# Patient Record
Sex: MALE | Race: WHITE | Employment: UNEMPLOYED | URBAN - METROPOLITAN AREA
[De-identification: names, ages, dates, MRNs, and addresses within clinical notes are randomized per-mention and may not be internally consistent; named-entity substitution may affect disease eponyms.]

---

## 2017-01-10 ENCOUNTER — OFFICE VISIT (OUTPATIENT)
Dept: SURGERY | Age: 32
End: 2017-01-10

## 2017-01-10 VITALS
HEIGHT: 65 IN | WEIGHT: 173 LBS | OXYGEN SATURATION: 98 % | HEART RATE: 114 BPM | SYSTOLIC BLOOD PRESSURE: 148 MMHG | TEMPERATURE: 98.3 F | BODY MASS INDEX: 28.82 KG/M2 | DIASTOLIC BLOOD PRESSURE: 98 MMHG | RESPIRATION RATE: 17 BRPM

## 2017-01-10 DIAGNOSIS — L02.215 PERINEAL ABSCESS: Primary | ICD-10-CM

## 2017-01-10 NOTE — MR AVS SNAPSHOT
Visit Information Date & Time Provider Department Dept. Phone Encounter #  
 1/10/2017  1:00 PM MD Gabriela Zayas [facility] [phone] [id number] Your Appointments 1/10/2017  1:00 PM  
Follow Up with MD Gabriela [facility] [place] [facility]) Appt Note: Open wound at surgery site/ self referred/ Medicare [address], [address], [address] [place] [id number]  
  
    
 2/7/2017 11:00 AM  
Follow Up with MD Gabriela Zayas [facility] [place] [facility]) Appt Note: 1 month f/up [address], [address]  
[phone] Upcoming Health Maintenance Date Due Pneumococcal 19-64 Medium Risk (1 of 1 - PPSV23) 3/12/2003 DTaP/Tdap/Td series (1 - Tdap) 3/12/2005 Allergies as of 1/10/2017  Review Complete On: 1/10/2017 By: Monique Cardenas MD  
  
 Severity Noted Reaction Type Reactions Ciprofloxacin  11/26/2014    Other (comments) Nerve damage in hands Flagyl [Metronidazole]  11/26/2014    Other (comments) Other reaction(s): neurological reaction Pt states he has \"nerve damage\" to his hands Nerve problem Current Immunizations  Never Reviewed Name Date Influenza Vaccine (Quad) PF 10/5/2016  8:45 AM  
  
 Not reviewed this visit You Were Diagnosed With   
  
 Codes Comments Perineal abscess    -  Primary ICD-10-CM: L02.215 ICD-9-CM: 630. 2 Peristomal skin breakdown (HCC)     ICD-10-CM: L62.234 ICD-9-CM: 707.9 Vitals BP Pulse Temp Resp Height(growth percentile) Weight(growth percentile) (!) 148/98 (BP 1 Location: Right arm, BP Patient Position: Sitting) (!) 114 98.3 °F (36.8 °C) (Oral) 17 5' 5\" (1.651 m) 173 lb (78.5 kg) SpO2 BMI Smoking Status 98% 28.79 kg/m2 Current Every Day Smoker BMI and BSA Data Body Mass Index Body Surface Area 28.79 kg/m 2 1.9 m 2 Preferred Pharmacy Pharmacy Name Phone [facility], [facility] [address] [phone] Your Updated Medication List  
  
   
This list is accurate as of: 1/10/17 12:43 PM.  Always use your most recent med list.  
  
  
  
  
 * PERCOCET 5-325 mg per tablet Generic drug:  oxyCODONE-acetaminophen Take 1 Tab by mouth every four (4) hours as needed for Pain. * oxyCODONE-acetaminophen 5-325 mg per tablet Commonly known as:  PERCOCET Take 1 Tab by mouth every six (6) hours as needed for Pain. Max Daily Amount: 4 Tabs. SEROquel 300 mg tablet Generic drug:  QUEtiapine Take 300 mg by mouth nightly. sildenafil citrate 100 mg tablet Commonly known as:  VIAGRA Take 1 Tab by mouth daily as needed. SUBOXONE 8-2 mg Film sublingaul film Generic drug:  buprenorphine-naloxone TAKE 1 AND 1/2 FILMS UNDER TONGUE DAILY FOR 6 DAYS  
  
 therapeutic multivitamin tablet Commonly known as:  [facility] Take 1 Tab by mouth daily. VITAMIN B-12 500 mcg tablet Generic drug:  cyanocobalamin Take 500 mcg by mouth daily. * Notice: This list has 2 medication(s) that are the same as other medications prescribed for you. Read the directions carefully, and ask your doctor or other care provider to review them with you. Please provide this summary of care documentation to your next provider. Your primary care clinician is listed as Harrie Fothergill. If you have any questions after today's visit, please call [phone].

## 2017-01-10 NOTE — LETTER
NOTIFICATION RETURN TO WORK / SCHOOL 
 
1/10/2017 12:23 PM 
 
Mr. Sarah Ontiveros [address] [facility] [address] To Whom It May Concern: 
 
Sarah Ontiveros is currently under the care of [address] [place] [address]. He underwent a surgical procedure on 11/16/2016 which required in home wound care for a month post op. He was seen in our office today, 1/10/17. If there are questions or concerns please have the patient contact our office. Sincerely, Hernan Walker MD

## 2017-01-10 NOTE — PROGRESS NOTES
[facility]  [id number] [place] [address], [address] [place] [address]              Patient: Lakshmi Cornell  Admitted: (Not on file) MRN: [id number]     Age:  27 y.o.,      Sex: male    YOB: 1985       Conor Avila is an 27 y.o. male who is here for a follow up post op visit. He is status post the following procedures for perineal/pelvic abscess with a chronic abscess cavity and peristomal skin breakdown with superficial sinus tracks on 11/16/2016:      1. Examination under anesthesia, drainage of pelvic abscess and debridement of chronic abscess cavity. 2. Repair of peristomal skin breakdown with laying open of the sinus tracts and debridement of the superficial scarred/necrotic skin.      The intraoperative findings showed a deep perineal/pelvic chronic abscess cavity at the midportion of the  perineal wound slightly to the right of the midline. There were chronic necrotic debris within the cavity which measured about 3 cm in size. This cavity was several centimeters deep to the perineal incision site. Also a superficial peristomal skin breakdown medial to the colostomy with several superficial sinus tracks present. When laid open, chronic granulated tissue was present. The open wound measured about 10 mm x 5 mm situated radially to the colostomy.      He now feels that the peristomal wound has completely healed. Also his operative site in the perineum has healed, but now another open drainage area just anterior to this site developed. The patient' wife has been packing this wound. Otherwise he is currently without complaints. His bowels are regular via his ileostomy, and the patient denies fever.         Objective    Vitals:    01/10/17 1202   BP: (!) 148/98   Pulse: (!) 114   Resp: 17   Temp: 98.3 °F (36.8 °C)   TempSrc: Oral   SpO2: 98%   Weight: 78.5 kg (173 lb)   Height: 5' 5\" (1.651 m)   PainSc:   5   PainLoc: Rectum       Physical Exam:  General: alert, cooperative, no distress, appears stated age  Abdomen: soft, non-distended. Colostomy intact with now healed peristomal skin wound. Perineum: With the patient in the left alteral position the previous open wound was healed. However another small (3-4 mm) open wound was present several cm anterior atr the midline. There was packing present and thsi was removed. There appeared to be a sinus tract about 4-5 cm deep. The tract was packed with 1/4 inch Iodoform Nu Gauge. The wound appeared to be clean without active infection present. Assessment / Plan    Mr. Golden Thompson is overall doing well postoperatively. The patient will continue the local wound care with daily packing to the perineal wound. Return in 4 weeks for follow up.           Darrius Olivo MD, FACS, FASCRS  Colon and Rectal Surgery  [facility]  Office [phone]  Fax     [phone]  1/10/2017  12:27 PM

## 2017-01-13 ENCOUNTER — TELEPHONE (OUTPATIENT)
Dept: SURGERY | Age: 32
End: 2017-01-13

## 2017-01-13 NOTE — TELEPHONE ENCOUNTER
Received call from patient requesting pain medication due to tenderness at surgical site. Spoke with NP as Dr. Gustavo Shin is out of town. Per NP, due to surgery being over 2 months ago patient should see his pain management doctor. Patient verbalized understanding.

## 2017-07-17 ENCOUNTER — HOSPITAL ENCOUNTER (EMERGENCY)
Age: 32
Discharge: HOME | End: 2017-07-17
Attending: EMERGENCY MEDICINE
Payer: MEDICARE

## 2017-07-17 ENCOUNTER — APPOINTMENT (OUTPATIENT)
Dept: CT IMAGING | Age: 32
End: 2017-07-17
Attending: PHYSICIAN ASSISTANT
Payer: MEDICARE

## 2017-07-17 VITALS
DIASTOLIC BLOOD PRESSURE: 86 MMHG | HEIGHT: 65 IN | TEMPERATURE: 97.8 F | WEIGHT: 175 LBS | OXYGEN SATURATION: 97 % | HEART RATE: 79 BPM | RESPIRATION RATE: 19 BRPM | BODY MASS INDEX: 29.16 KG/M2 | SYSTOLIC BLOOD PRESSURE: 137 MMHG

## 2017-07-17 DIAGNOSIS — R10.84 ABDOMINAL PAIN, GENERALIZED: ICD-10-CM

## 2017-07-17 DIAGNOSIS — K50.919 CROHN'S DISEASE WITH COMPLICATION, UNSPECIFIED GASTROINTESTINAL TRACT LOCATION (HCC): Primary | ICD-10-CM

## 2017-07-17 DIAGNOSIS — R14.0 ABDOMINAL DISTENSION, GASEOUS: ICD-10-CM

## 2017-07-17 LAB
ALBUMIN SERPL BCP-MCNC: 3.7 G/DL (ref 3.4–5)
ALBUMIN/GLOB SERPL: 1.1 {RATIO} (ref 0.8–1.7)
ALP SERPL-CCNC: 144 U/L (ref 45–117)
ALT SERPL-CCNC: 63 U/L (ref 16–61)
ANION GAP BLD CALC-SCNC: 8 MMOL/L (ref 3–18)
APPEARANCE UR: ABNORMAL
AST SERPL W P-5'-P-CCNC: 29 U/L (ref 15–37)
BASOPHILS # BLD AUTO: 0 K/UL (ref 0–0.06)
BASOPHILS # BLD: 0 % (ref 0–2)
BILIRUB SERPL-MCNC: 0.2 MG/DL (ref 0.2–1)
BILIRUB UR QL: NEGATIVE
BUN SERPL-MCNC: 11 MG/DL (ref 7–18)
BUN/CREAT SERPL: 10 (ref 12–20)
CALCIUM SERPL-MCNC: 8.4 MG/DL (ref 8.5–10.1)
CAOX CRY URNS QL MICRO: ABNORMAL
CHLORIDE SERPL-SCNC: 103 MMOL/L (ref 100–108)
CO2 SERPL-SCNC: 30 MMOL/L (ref 21–32)
COLOR UR: ABNORMAL
CREAT SERPL-MCNC: 1.06 MG/DL (ref 0.6–1.3)
DIFFERENTIAL METHOD BLD: ABNORMAL
EOSINOPHIL # BLD: 0 K/UL (ref 0–0.4)
EOSINOPHIL NFR BLD: 0 % (ref 0–5)
EPITH CASTS URNS QL MICRO: ABNORMAL /LPF (ref 0–5)
ERYTHROCYTE [DISTWIDTH] IN BLOOD BY AUTOMATED COUNT: 14.8 % (ref 11.6–14.5)
GLOBULIN SER CALC-MCNC: 3.5 G/DL (ref 2–4)
GLUCOSE SERPL-MCNC: 113 MG/DL (ref 74–99)
GLUCOSE UR STRIP.AUTO-MCNC: NEGATIVE MG/DL
HCT VFR BLD AUTO: 40.7 % (ref 36–48)
HGB BLD-MCNC: 13.5 G/DL (ref 13–16)
HGB UR QL STRIP: NEGATIVE
KETONES UR QL STRIP.AUTO: ABNORMAL MG/DL
LEUKOCYTE ESTERASE UR QL STRIP.AUTO: ABNORMAL
LIPASE SERPL-CCNC: 104 U/L (ref 73–393)
LYMPHOCYTES # BLD AUTO: 23 % (ref 21–52)
LYMPHOCYTES # BLD: 2.4 K/UL (ref 0.9–3.6)
MCH RBC QN AUTO: 27.8 PG (ref 24–34)
MCHC RBC AUTO-ENTMCNC: 33.2 G/DL (ref 31–37)
MCV RBC AUTO: 83.7 FL (ref 74–97)
MONOCYTES # BLD: 0.5 K/UL (ref 0.05–1.2)
MONOCYTES NFR BLD AUTO: 5 % (ref 3–10)
MUCOUS THREADS URNS QL MICRO: ABNORMAL /LPF
NEUTS SEG # BLD: 7.6 K/UL (ref 1.8–8)
NEUTS SEG NFR BLD AUTO: 72 % (ref 40–73)
NITRITE UR QL STRIP.AUTO: NEGATIVE
PH UR STRIP: 6.5 [PH] (ref 5–8)
PLATELET # BLD AUTO: 328 K/UL (ref 135–420)
PMV BLD AUTO: 9.4 FL (ref 9.2–11.8)
POTASSIUM SERPL-SCNC: 3.8 MMOL/L (ref 3.5–5.5)
PROT SERPL-MCNC: 7.2 G/DL (ref 6.4–8.2)
PROT UR STRIP-MCNC: ABNORMAL MG/DL
RBC # BLD AUTO: 4.86 M/UL (ref 4.7–5.5)
RBC #/AREA URNS HPF: ABNORMAL /HPF (ref 0–5)
SODIUM SERPL-SCNC: 141 MMOL/L (ref 136–145)
SP GR UR REFRACTOMETRY: >1.03 (ref 1–1.03)
UROBILINOGEN UR QL STRIP.AUTO: 1 EU/DL (ref 0.2–1)
WBC # BLD AUTO: 10.5 K/UL (ref 4.6–13.2)
WBC URNS QL MICRO: ABNORMAL /HPF (ref 0–4)

## 2017-07-17 PROCEDURE — 83690 ASSAY OF LIPASE: CPT

## 2017-07-17 PROCEDURE — 74176 CT ABD & PELVIS W/O CONTRAST: CPT

## 2017-07-17 PROCEDURE — 80053 COMPREHEN METABOLIC PANEL: CPT

## 2017-07-17 PROCEDURE — 85025 COMPLETE CBC W/AUTO DIFF WBC: CPT

## 2017-07-17 PROCEDURE — 96360 HYDRATION IV INFUSION INIT: CPT

## 2017-07-17 PROCEDURE — 74011250636 HC RX REV CODE- 250/636: Performed by: PHYSICIAN ASSISTANT

## 2017-07-17 PROCEDURE — 96361 HYDRATE IV INFUSION ADD-ON: CPT

## 2017-07-17 PROCEDURE — 99283 EMERGENCY DEPT VISIT LOW MDM: CPT

## 2017-07-17 PROCEDURE — 81001 URINALYSIS AUTO W/SCOPE: CPT

## 2017-07-17 RX ADMIN — SODIUM CHLORIDE 1000 ML: 900 INJECTION, SOLUTION INTRAVENOUS at 16:56

## 2017-07-17 NOTE — ED PROVIDER NOTES
HPI Comments: Pt is a 33 yo male with PMH of crohn's disease, MRSA, anxiety, and chronic pain presents to the ED for abdominal pain and distension. Generalized abd pain began 3 days ago after eating, and slowly has worsened and now has distension of the abd. He tried a liquid diet 2 days ago with some improvement. Ate small amt of solids yesterday and today, with minimal stool output, still passing flatus. Pt has ileostomy in place, hx of removal colon, rectum, anus. Pt endorses some nausea without vomiting. Pt saw GI today and was sent here by Dr. Doug Arias for labs and CT to rule out obstruction as distension is abnormal for pt. Pt denies any other complaints at this time. Patient is a 34 y.o. male presenting with abdominal pain. The history is provided by the patient (Dr. Doug Arias). Abdominal Pain    Associated symptoms include anorexia, flatus and nausea. Pertinent negatives include no fever, no belching, no diarrhea, no hematochezia, no melena, no vomiting, no constipation, no dysuria, no frequency, no hematuria, no headaches, no arthralgias, no myalgias, no trauma, no chest pain and no testicular pain. The pain is worsened by eating. The pain is relieved by nothing. His past medical history is significant for Crohn's disease and small bowel obstruction. The patient's surgical history includes colectomy.        Past Medical History:   Diagnosis Date    ADHD (attention deficit hyperactivity disorder)     Bone infection ([address]     Chronic pain     lower back    Crohn's disease ([address]     Crohn's disease involving stomach ([address]     Erectile dysfunction     Ill-defined condition     Crohn's    MRSA (methicillin resistant staph aureus) culture positive 3/27/2016    Psychiatric disorder     Anxiety    Stool color black     crohns       Past Surgical History:   Procedure Laterality Date    ABDOMEN SURGERY PROC UNLISTED      rectum resection    HX COLONOSCOPY      HX GI      low anterior resection    HX GI 07/15/2015    pelvic and abd abscess colostomy    HX OTHER SURGICAL      several placements drains         History reviewed. No pertinent family history. Social History     Social History    Marital status: UNKNOWN     Spouse name: N/A    Number of children: N/A    Years of education: N/A     Occupational History    Not on file. Social History Main Topics    Smoking status: Current Every Day Smoker     Packs/day: 1.00     Years: 15.00     Types: Cigarettes    Smokeless tobacco: Never Used    Alcohol use No    Drug use: No      Comment: hx of thc    Sexual activity: Not Currently     Other Topics Concern    Not on file     Social History Narrative         ALLERGIES: Ciprofloxacin and Flagyl [metronidazole]    Review of Systems   Constitutional: Negative for chills and fever. HENT: Negative for ear pain, rhinorrhea and sore throat. Eyes: Negative for pain and redness. Respiratory: Negative for cough and shortness of breath. Cardiovascular: Negative for chest pain. Gastrointestinal: Positive for abdominal distention, abdominal pain, anorexia, flatus and nausea. Negative for blood in stool, constipation, diarrhea, hematochezia, melena and vomiting. Genitourinary: Negative for dysuria, frequency, hematuria and testicular pain. Musculoskeletal: Negative for arthralgias and myalgias. Skin: Negative. Neurological: Negative for dizziness, light-headedness and headaches. Psychiatric/Behavioral: Negative. Vitals:    07/17/17 1553   BP: 114/80   Pulse: 79   Resp: 19   Temp: 97.8 °F (36.6 °C)   SpO2: 96%   Weight: 79.4 kg (175 lb)   Height: 5' 5\" (1.651 m)            Physical Exam   Constitutional: He is oriented to person, place, and time. He appears well-developed and well-nourished. No distress. HENT:   Head: Normocephalic and atraumatic.    Right Ear: Tympanic membrane, external ear and ear canal normal.   Left Ear: Tympanic membrane, external ear and ear canal normal.   Nose: Nose normal.   Mouth/Throat: Oropharynx is clear and moist and mucous membranes are normal.   Eyes: Conjunctivae and EOM are normal. Pupils are equal, round, and reactive to light. No scleral icterus. Neck: Normal range of motion. Neck supple. Cardiovascular: Normal rate, regular rhythm, normal heart sounds and intact distal pulses. Exam reveals no gallop. No murmur heard. Pulmonary/Chest: Effort normal and breath sounds normal.   Abdominal: Soft. He exhibits distension. Bowel sounds are increased. There is generalized tenderness. There is no rigidity, no rebound, no guarding, no tenderness at McBurney's point and negative Bauer's sign. Ileostomy with bag in place. Musculoskeletal: Normal range of motion. Neurological: He is alert and oriented to person, place, and time. Skin: Skin is warm and dry. He is not diaphoretic. Psychiatric: He has a normal mood and affect. His behavior is normal. Judgment and thought content normal.   Nursing note and vitals reviewed. MDM  Number of Diagnoses or Management Options  Diagnosis management comments: DDx: gastroenteritis, GERD, hernia, hepatitis, pancreatitis, gallbladder etiology, constipation, adhesions, UTI, pyelo, kidney stones, STD,  Fnrs-Wwnx-Oexjgs syndrome, preg, ectopic, ovarian cyst, ovarian torsion, tubo-ovarian abscess, appendicitis, diverticulitis, SBO, GI bleed, mesenteric ischemia, AAA, cardiac etiology, musculoskeletal pain/spasm, malignancy    Dr. Angeli Mendoza spoke with Dr. Glenna Fernandes, requesting us to perform CT and basic labs. CT ABD IMPRESSION:   1. Short 4 cm segment mural thickening at the distal small bowel extending into  the enterocolic anastomosis at the right lower quadrant, compatible with active  Crohn's disease.  -Mild mesenteric adenopathy, probably reactive  2. Fluid levels noted throughout the colon, compatible with diarrhea. Left lower  quadrant colostomy without evidence for mechanical obstruction.   3. Similar appearance of chronic presacral edema with bandlike density extending  towards the perineum. Will page GI to discuss plan as GI sent pt to ED for eval. Derrell Faust PA-C 7:00 PM     Consult:    7:08 PM   Discussed care with Dr. Jazmin Ellington with GI. Standard discussion; including history of patients chief complaint, available diagnostic results, and treatment course. PLAN: Discharge patient to home on a liquid only diet. Patient to call office tomorrow and speak with Dr. Pelon Luu regarding further treatment plan at that time. Pt results have been reviewed with them. They have been counseled regarding diagnosis, treatment, and plan. Pt verbally conveys understanding and agreement of the signs, symptoms, diagnosis, treatment and prognosis and additionally agrees to follow up as discussed. Pt also agrees with the care-plan and conveys that all of their questions have been answered. I have also provided discharge instructions for them that include: educational information regarding their diagnosis and treatment, and list of reasons why they would want to return to the ED prior to their follow-up appointment, should their condition change.    Derrell Faust PA-C 7:09 PM          Amount and/or Complexity of Data Reviewed  Clinical lab tests: ordered and reviewed  Tests in the radiology section of CPT®: ordered and reviewed  Review and summarize past medical records: yes  Discuss the patient with other providers: yes  Independent visualization of images, tracings, or specimens: yes    Risk of Complications, Morbidity, and/or Mortality  Presenting problems: moderate  Diagnostic procedures: moderate  Management options: moderate    Patient Progress  Patient progress: stable    ED Course       Procedures      Labs Reviewed   CBC WITH AUTOMATED DIFF - Abnormal; Notable for the following:        Result Value    RDW 14.8 (*)     All other components within normal limits   METABOLIC PANEL, COMPREHENSIVE - Abnormal; Notable for the following:     Glucose 113 (*)     BUN/Creatinine ratio 10 (*)     Calcium 8.4 (*)     ALT (SGPT) 63 (*)     Alk. phosphatase 144 (*)     All other components within normal limits   URINALYSIS W/ RFLX MICROSCOPIC - Abnormal; Notable for the following:     Specific gravity >1.030 (*)     Protein TRACE (*)     Ketone TRACE (*)     Leukocyte Esterase TRACE (*)     All other components within normal limits   URINE MICROSCOPIC ONLY - Abnormal; Notable for the following:     Mucus 4+ (*)     CA Oxalate crystals 4+ (*)     All other components within normal limits   LIPASE     Diagnosis:   1. Crohn's disease with complication, unspecified gastrointestinal tract location ([address]    2. Abdominal pain, generalized    3. Abdominal distension, gaseous          Disposition: home    Follow-up Information     Follow up With Details Comments Contact Info    [facility] EMERGENCY DEPT  As needed, If symptoms worsen [address] [facility]    Noe Perkins MD Call on 7/18/2017  [address] [place] [address]  [address] [address]      Patricio Solis MD Go in 2 days  [facility] [address]  [phone]            Patient's Medications   Start Taking    No medications on file   Continue Taking    CYANOCOBALAMIN (VITAMIN B-12) 500 MCG TABLET    Take 500 mcg by mouth daily. OXYCODONE-ACETAMINOPHEN (PERCOCET) 5-325 MG PER TABLET    Take 1 Tab by mouth every four (4) hours as needed for Pain. OXYCODONE-ACETAMINOPHEN (PERCOCET) 5-325 MG PER TABLET    Take 1 Tab by mouth every six (6) hours as needed for Pain. Max Daily Amount: 4 Tabs. QUETIAPINE (SEROQUEL) 300 MG TABLET    Take 300 mg by mouth nightly. SILDENAFIL CITRATE (VIAGRA) 100 MG TABLET    Take 1 Tab by mouth daily as needed.     SUBOXONE 8-2 MG FILM SUBLINGAUL FILM    TAKE 1 AND 1/2 FILMS UNDER TONGUE DAILY FOR 6 DAYS    THERAPEUTIC MULTIVITAMIN (THERAGRAN) TABLET    Take 1 Tab by mouth daily.    These Medications have changed    No medications on file   Stop Taking    No medications on file

## 2018-06-11 ENCOUNTER — TELEPHONE (OUTPATIENT)
Dept: SURGERY | Age: 33
End: 2018-06-11

## 2018-06-11 NOTE — TELEPHONE ENCOUNTER
Received call from patient stating that he is out of ostomy supplies and cannot get more due to the script being under Dr. Suleiman Piña' name. Instructed patient to have company send us the order to sign. Address and fax # provided. Patient would like to be seen for evaluation of parastomal hernia. Appt made.

## 2018-11-22 ENCOUNTER — TELEPHONE (OUTPATIENT)
Dept: SURGERY | Age: 33
End: 2018-11-22

## 2018-11-22 NOTE — TELEPHONE ENCOUNTER
Called by ER MD at [facility] about patient being in the ER. He presented with abdominal pain and nausea. He was diagnosed with ileitis and GI was going to treat with steroids. Per report, he also is having prolapse of the stoma which would easily reduce, but continued to prolapse. The ER MD was unsure the type of stoma, but from the OP noted in 2015, it is likely colon post APR. As the tissue prolapsing was normal and without edema and the ER MD did not feel he needed admission, I advised follow up in the office. He is currently incarcerated, so the [facility] will need to set this up. They are to call if changes or if he needs to be seen more urgently.     Oni Apple MD

## 2019-05-16 PROBLEM — Z98.890 POSTPROCEDURAL STATE: Status: ACTIVE | Noted: 2019-05-16

## 2020-08-31 ENCOUNTER — OFFICE VISIT (OUTPATIENT)
Dept: SURGERY | Age: 35
End: 2020-08-31

## 2020-08-31 VITALS
RESPIRATION RATE: 18 BRPM | DIASTOLIC BLOOD PRESSURE: 127 MMHG | OXYGEN SATURATION: 99 % | WEIGHT: 158 LBS | HEIGHT: 65 IN | TEMPERATURE: 98.4 F | SYSTOLIC BLOOD PRESSURE: 181 MMHG | BODY MASS INDEX: 26.33 KG/M2 | HEART RATE: 78 BPM

## 2020-08-31 DIAGNOSIS — K43.5 PARASTOMAL HERNIA WITHOUT OBSTRUCTION OR GANGRENE: Primary | ICD-10-CM

## 2020-08-31 DIAGNOSIS — K94.19 INTESTINAL STOMA PROLAPSE (HCC): ICD-10-CM

## 2020-08-31 NOTE — PROGRESS NOTES
HPI: Alejandra Melara is a 38 y.o. male presenting with chief complain of colostomy prolapse and parastomal hernia. The patient underwent abdominal perineal resection for Crohn's disease approximately 3 years ago. He is also had likely an ileocecectomy laparoscopically. After his APR he did develop some chronic pain and was being maintained on opioid narcotics and had some issues with dependency. Currently he is on maintenance therapy. He tells me he has significant prolapse of his colostomy. Probably 5 or 6 inches of prolapse. There is bleeding at times as well. There is difficulty with the appliance and leakage around the appliance. He also notes some bulging on the lateral side of his colostomy and is concerned he has a hernia. This causes discomfort with any substantial lifting.     Past Medical History:   Diagnosis Date    ADHD (attention deficit hyperactivity disorder)     Bone infection ([address]     Chronic pain     lower back    Crohn's disease ([address]     Crohn's disease involving stomach ([address]     Erectile dysfunction     Ill-defined condition     Crohn's    MRSA (methicillin resistant staph aureus) culture positive 3/27/2016    Psychiatric disorder     Anxiety    Stool color black     crohns       Past Surgical History:   Procedure Laterality Date    ABDOMEN SURGERY PROC UNLISTED      rectum resection    HX COLONOSCOPY      HX GI      low anterior resection    HX GI  07/15/2015    pelvic and abd abscess colostomy    HX OTHER SURGICAL      several placements drains       Family History   Problem Relation Age of Onset    Hypertension Mother        Social History     Socioeconomic History    Marital status: UNKNOWN     Spouse name: Not on file    Number of children: Not on file    Years of education: Not on file    Highest education level: Not on file   Tobacco Use    Smoking status: Current Every Day Smoker     Packs/day: 1.00     Years: 15.00     Pack years: 15.00     Types: Cigarettes    Smokeless tobacco: Never Used   Substance and Sexual Activity    Alcohol use: No     Alcohol/week: 0.0 standard drinks    Drug use: No     Comment: hx of thc    Sexual activity: Not Currently       Review of Systems - Review of Systems   Constitutional: Negative. HENT: Negative. Eyes: Negative. Respiratory: Negative. Cardiovascular: Negative. Gastrointestinal: Positive for abdominal pain. Negative for blood in stool, constipation, diarrhea, heartburn, melena, nausea and vomiting. Hernia around stoma   Genitourinary: Negative. Musculoskeletal: Negative. Skin: Negative. Neurological: Negative. Endo/Heme/Allergies: Negative. Psychiatric/Behavioral: Negative. Outpatient Medications Marked as Taking for the 8/31/20 encounter (Office Visit) with Rakan Rendon MD   Medication Sig Dispense Refill    buprenorphine-naloxone 8-2 mg subl by SubLINGual route daily.  tadalafiL (CIALIS) 5 mg tablet Take 1 Tab by mouth daily. 90 Tab 0    sildenafil citrate (VIAGRA) 100 mg tablet Take 1 Tab by mouth daily as needed for Erectile Dysfunction. 10 Tab 1    buPROPion XL (WELLBUTRIN XL) 300 mg XL tablet TK 1 T PO QD      busPIRone (BUSPAR) 15 mg tablet TK 1 T PO  BID      dextroamphetamine-amphetamine (ADDERALL) 20 mg tablet TK 1 T PO BID      sildenafil citrate (VIAGRA) 100 mg tablet Take 1 Tab by mouth daily as needed. 6 Tab 1    cyanocobalamin (VITAMIN B-12) 500 mcg tablet Take 500 mcg by mouth daily.  therapeutic multivitamin (THERAGRAN) tablet Take 1 Tab by mouth daily.          Allergies   Allergen Reactions    Ciprofloxacin Other (comments)     Nerve damage in hands    Flagyl [Metronidazole] Other (comments)     Other reaction(s): neurological reaction  Pt states he has \"nerve damage\" to his hands  Nerve problem       Vitals:    08/31/20 1410   BP: (!) 181/127   Pulse: 78   Resp: 18   Temp: 98.4 °F (36.9 °C)   SpO2: 99%   Weight: 71.7 kg (158 lb) Height: 5' 5\" (1.651 m)   PainSc:   5       Physical Exam  Constitutional:       Appearance: He is well-developed. HENT:      Head: Normocephalic and atraumatic. Eyes:      Conjunctiva/sclera: Conjunctivae normal.   Abdominal:      General: There is no distension. Palpations: Abdomen is soft. There is no mass. Tenderness: There is no abdominal tenderness. Musculoskeletal: Normal range of motion. Lymphadenopathy:      Cervical: No cervical adenopathy. Skin:     General: Skin is warm and dry. Neurological:      Sensory: No sensory deficit. Psychiatric:         Speech: Speech normal.     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 The diagnoses and plan were discussed with the patient. All questions answered. Plan of care agreed to by all concerned.

## 2020-08-31 NOTE — LETTER
8/31/20 Patient: Donita Pope YOB: 1985 Date of Visit: 8/31/2020 Adin Diaz MD 
[address] [place] [id number] [place] [phone] VIA Facsimile: [phone] Dear Sina Christensen, I saw Nathan Valladares in the office today for possible parastomal hernia as well as colostomy prolapse. He underwent abdominal perineal resection by my former partner approximately 3 years ago for Crohn's disease. He is also previously underwent ileocecectomy as well. He states he has discomfort with any lifting at the colostomy site and also has substantial prolapse making it difficult to fit appliances properly. His exam is largely normal.  He also did have some issues with opioid dependency after his last operation. I tell him we can order CT imaging to better evaluate for the hernia. We can consider both parastomal hernia repair as well as revision of the colostomy for his stomal prolapse. He has had infectious complications after prior surgery and I tell him that he should take this into consideration and weighed against the amount of discomfort he is currently having. If the CT imaging proves a hernia we may take him to the operating room for revision of his stoma and repair of the hernia. If you have questions, please do not hesitate to call me. I look forward to following your patient along with you. Sincerely, Yue Khoury MD

## 2020-12-03 ENCOUNTER — HOSPITAL ENCOUNTER (OUTPATIENT)
Dept: CT IMAGING | Age: 35
Discharge: HOME | End: 2020-12-03
Attending: COLON & RECTAL SURGERY
Payer: MEDICARE

## 2020-12-03 DIAGNOSIS — K43.5 PARASTOMAL HERNIA WITHOUT OBSTRUCTION OR GANGRENE: ICD-10-CM

## 2020-12-03 DIAGNOSIS — K94.19 INTESTINAL STOMA PROLAPSE (HCC): ICD-10-CM

## 2020-12-03 PROCEDURE — 74011000636 HC RX REV CODE- 636: Performed by: COLON & RECTAL SURGERY

## 2020-12-03 PROCEDURE — 74177 CT ABD & PELVIS W/CONTRAST: CPT

## 2020-12-03 RX ADMIN — IOPAMIDOL 100 ML: 612 INJECTION, SOLUTION INTRAVENOUS at 19:37

## 2021-01-04 ENCOUNTER — OFFICE VISIT (OUTPATIENT)
Dept: SURGERY | Age: 36
End: 2021-01-04
Payer: MEDICARE

## 2021-01-04 VITALS
HEIGHT: 65 IN | SYSTOLIC BLOOD PRESSURE: 127 MMHG | WEIGHT: 166 LBS | BODY MASS INDEX: 27.66 KG/M2 | RESPIRATION RATE: 18 BRPM | OXYGEN SATURATION: 98 % | HEART RATE: 72 BPM | DIASTOLIC BLOOD PRESSURE: 80 MMHG | TEMPERATURE: 97.7 F

## 2021-01-04 DIAGNOSIS — Z87.19 HISTORY OF CROHN'S DISEASE: ICD-10-CM

## 2021-01-04 DIAGNOSIS — Z43.3 ATTENTION TO COLOSTOMY (HCC): Primary | ICD-10-CM

## 2021-01-04 PROCEDURE — 99214 OFFICE O/P EST MOD 30 MIN: CPT | Performed by: COLON & RECTAL SURGERY

## 2021-01-04 NOTE — PROGRESS NOTES
Subjective: He comes in in follow-up for his parastomal hernia and stomal prolapse. He tells me that this causes extreme discomfort with any physical activity. He cannot work. He states the prolapse is not as much of an issue as the hernia. The hernia causes difficulty with appliance fixation as well. Past medical history and ROS were reviewed and unchanged. Abdomen: Soft, nontender nondistended  Midline incision    CT abdomen pelvis personally visualized; parastomal hernia; thickened ileum. Assessment / Plan    Status post APR and ileocecectomy for Crohn's, now parastomal hernia  Schedule laparoscopic parastomal hernia repair  I will likely defer on revision of the colostomy for the prolapse at this point  I tell him if there is extensive adhesions he will require formal laparotomy  I tell him that he is an increased risk for infectious complications given his underlying Crohn's disease, including enterocutaneous fistula and mesh infection  He understands this and is willing to proceed    A total of 30 minutes was spent in the care of this patient    The diagnoses and plan were discussed with patient. All questions answered. Plan of care agreed to by all concerned.

## 2021-01-28 ENCOUNTER — HOSPITAL ENCOUNTER (OUTPATIENT)
Dept: PREADMISSION TESTING | Age: 36
Discharge: HOME | End: 2021-01-28
Payer: MEDICARE

## 2021-01-28 DIAGNOSIS — Z43.3 ATTENTION TO COLOSTOMY (HCC): ICD-10-CM

## 2021-01-28 DIAGNOSIS — Z87.19 HISTORY OF CROHN'S DISEASE: ICD-10-CM

## 2021-01-28 LAB
ABO + RH BLD: NORMAL
ALBUMIN SERPL-MCNC: 3.7 G/DL (ref 3.4–5)
ANION GAP SERPL CALC-SCNC: 7 MMOL/L (ref 3–18)
APTT PPP: 31.6 SEC (ref 23–36.4)
ATRIAL RATE: 67 BPM
BASOPHILS # BLD: 0 K/UL (ref 0–0.1)
BASOPHILS NFR BLD: 0 % (ref 0–2)
BLOOD GROUP ANTIBODIES SERPL: NORMAL
BUN SERPL-MCNC: 8 MG/DL (ref 7–18)
BUN/CREAT SERPL: 9 (ref 12–20)
CALCIUM SERPL-MCNC: 8.9 MG/DL (ref 8.5–10.1)
CALCULATED P AXIS, ECG09: 59 DEGREES
CALCULATED R AXIS, ECG10: 71 DEGREES
CALCULATED T AXIS, ECG11: 42 DEGREES
CHLORIDE SERPL-SCNC: 107 MMOL/L (ref 100–111)
CO2 SERPL-SCNC: 28 MMOL/L (ref 21–32)
CREAT SERPL-MCNC: 0.88 MG/DL (ref 0.6–1.3)
DIAGNOSIS, 93000: NORMAL
DIFFERENTIAL METHOD BLD: NORMAL
EOSINOPHIL # BLD: 0.1 K/UL (ref 0–0.4)
EOSINOPHIL NFR BLD: 1 % (ref 0–5)
ERYTHROCYTE [DISTWIDTH] IN BLOOD BY AUTOMATED COUNT: 14 % (ref 11.6–14.5)
GLUCOSE SERPL-MCNC: 71 MG/DL (ref 74–99)
HCT VFR BLD AUTO: 42.4 % (ref 36–48)
HGB BLD-MCNC: 13.6 G/DL (ref 13–16)
INR PPP: 1.1 (ref 0.8–1.2)
LYMPHOCYTES # BLD: 2.6 K/UL (ref 0.9–3.6)
LYMPHOCYTES NFR BLD: 29 % (ref 21–52)
MCH RBC QN AUTO: 27.2 PG (ref 24–34)
MCHC RBC AUTO-ENTMCNC: 32.1 G/DL (ref 31–37)
MCV RBC AUTO: 84.8 FL (ref 74–97)
MONOCYTES # BLD: 0.5 K/UL (ref 0.05–1.2)
MONOCYTES NFR BLD: 6 % (ref 3–10)
NEUTS SEG # BLD: 5.8 K/UL (ref 1.8–8)
NEUTS SEG NFR BLD: 64 % (ref 40–73)
P-R INTERVAL, ECG05: 150 MS
PLATELET # BLD AUTO: 308 K/UL (ref 135–420)
PMV BLD AUTO: 10 FL (ref 9.2–11.8)
POTASSIUM SERPL-SCNC: 4.1 MMOL/L (ref 3.5–5.5)
PROTHROMBIN TIME: 13.9 SEC (ref 11.5–15.2)
Q-T INTERVAL, ECG07: 392 MS
QRS DURATION, ECG06: 92 MS
QTC CALCULATION (BEZET), ECG08: 414 MS
RBC # BLD AUTO: 5 M/UL (ref 4.7–5.5)
SODIUM SERPL-SCNC: 142 MMOL/L (ref 136–145)
SPECIMEN EXP DATE BLD: NORMAL
VENTRICULAR RATE, ECG03: 67 BPM
WBC # BLD AUTO: 9.1 K/UL (ref 4.6–13.2)

## 2021-01-28 PROCEDURE — U0003 INFECTIOUS AGENT DETECTION BY NUCLEIC ACID (DNA OR RNA); SEVERE ACUTE RESPIRATORY SYNDROME CORONAVIRUS 2 (SARS-COV-2) (CORONAVIRUS DISEASE [COVID-19]), AMPLIFIED PROBE TECHNIQUE, MAKING USE OF HIGH THROUGHPUT TECHNOLOGIES AS DESCRIBED BY CMS-2020-01-R: HCPCS

## 2021-01-28 PROCEDURE — 82040 ASSAY OF SERUM ALBUMIN: CPT

## 2021-01-28 PROCEDURE — 86901 BLOOD TYPING SEROLOGIC RH(D): CPT

## 2021-01-28 PROCEDURE — 36415 COLL VENOUS BLD VENIPUNCTURE: CPT

## 2021-01-28 PROCEDURE — 93005 ELECTROCARDIOGRAM TRACING: CPT

## 2021-01-28 PROCEDURE — 85730 THROMBOPLASTIN TIME PARTIAL: CPT

## 2021-01-28 PROCEDURE — 85610 PROTHROMBIN TIME: CPT

## 2021-01-28 PROCEDURE — 85025 COMPLETE CBC W/AUTO DIFF WBC: CPT

## 2021-01-28 PROCEDURE — 80048 BASIC METABOLIC PNL TOTAL CA: CPT

## 2021-01-30 LAB — SARS-COV-2, COV2NT: NOT DETECTED

## 2021-11-01 ENCOUNTER — OFFICE VISIT (OUTPATIENT)
Dept: SURGERY | Age: 36
End: 2021-11-01
Payer: MEDICARE

## 2021-11-01 VITALS
TEMPERATURE: 98.4 F | HEART RATE: 75 BPM | SYSTOLIC BLOOD PRESSURE: 110 MMHG | WEIGHT: 160 LBS | RESPIRATION RATE: 18 BRPM | OXYGEN SATURATION: 96 % | DIASTOLIC BLOOD PRESSURE: 76 MMHG | BODY MASS INDEX: 26.66 KG/M2 | HEIGHT: 65 IN

## 2021-11-01 DIAGNOSIS — K43.5 PARASTOMAL HERNIA WITHOUT OBSTRUCTION OR GANGRENE: ICD-10-CM

## 2021-11-01 DIAGNOSIS — Z43.3 ATTENTION TO COLOSTOMY (HCC): Primary | ICD-10-CM

## 2021-11-01 DIAGNOSIS — K94.19 INTESTINAL STOMA PROLAPSE (HCC): ICD-10-CM

## 2021-11-01 PROCEDURE — G8432 DEP SCR NOT DOC, RNG: HCPCS | Performed by: COLON & RECTAL SURGERY

## 2021-11-01 PROCEDURE — 99214 OFFICE O/P EST MOD 30 MIN: CPT | Performed by: COLON & RECTAL SURGERY

## 2021-11-01 PROCEDURE — G8427 DOCREV CUR MEDS BY ELIG CLIN: HCPCS | Performed by: COLON & RECTAL SURGERY

## 2021-11-01 PROCEDURE — G8419 CALC BMI OUT NRM PARAM NOF/U: HCPCS | Performed by: COLON & RECTAL SURGERY

## 2021-11-01 RX ORDER — ERYTHROMYCIN 500 MG/1
1000 TABLET, DELAYED RELEASE ORAL 3 TIMES DAILY
Qty: 6 TABLET | Refills: 0 | Status: SHIPPED | OUTPATIENT
Start: 2021-11-01 | End: 2021-11-02

## 2021-11-01 RX ORDER — NEOMYCIN SULFATE 500 MG/1
1000 TABLET ORAL 3 TIMES DAILY
Qty: 6 TABLET | Refills: 0 | Status: SHIPPED | OUTPATIENT
Start: 2021-11-01 | End: 2021-11-02

## 2021-11-01 NOTE — PROGRESS NOTES
Subjective: He is seen in follow-up. He felt ill the day of surgery when it was planned in February. He still has issues with discomfort from the parastomal hernia as well as difficulty with the colostomy because of prolapse. Past medical history and ROS were reviewed and unchanged. Abdomen: Soft, nontender nondistended  Stoma with prolapse    Assessment / Plan    Proceed for robotic parastomal hernia repair with colon pexy  I tell him the prolapse may recur and need to be dealt with at a future operation  He understands and willing to proceed    30 minutes was spent in patient care. The diagnoses and plan were discussed with patient. All questions answered. Plan of care agreed to by all concerned.

## 2021-11-01 NOTE — PROGRESS NOTES
Edgardo Spence is a 39 y.o. male  Chief Complaint   Patient presents with    Follow-up     positive parastomal hernia. discuss surgery.

## 2021-11-29 NOTE — PERIOP NOTES
PRE-SURGICAL INSTRUCTIONS        Patient's Name:  Claudia Garza      UHHGO'B Date:  11/29/2021            Covid Testing Date and Time:    Surgery Date:  12/14/2021                1. Do NOT eat or drink anything, including candy, gum, or ice chips after midnight on 12/14, unless you have specific instructions from your surgeon or anesthesia provider to do so.  2. You may brush your teeth before coming to the hospital.  3. No smoking 24 hours prior to the day of surgery. 4. No alcohol 24 hours prior to the day of surgery. 5. No recreational drugs for one week prior to the day of surgery. 6. Leave all valuables, including money/purse, at home. 7. Remove all jewelry, nail polish, acrylic nails, and makeup (including mascara); no lotions powders, deodorant, or perfume/cologne/after shave on the skin. 8. Follow instruction for Hibiclens washes and CHG wipes from surgeon's office. 9. Glasses/contact lenses and dentures may be worn to the hospital.  They will be removed prior to surgery. 10. Call your doctor if symptoms of a cold or illness develop within 24-48 hours prior to your surgery. 11.  If you are having an outpatient procedure, please make arrangements for a responsible ADULT TO [address] [place] [address] and stay with you for 24 hours after your surgery. 12. ONE VISITOR in the hospital at this time for outpatient procedures. Exceptions may be made for surgical admissions, per nursing unit guidelines      Special Instructions:      Bring list of CURRENT medications. Bring any pertinent legal medical records. Take these medications the morning of surgery with a sip of water:  Per office    Complete bowel prep per MD instructions. On the day of surgery, come in the main entrance of [facility]. Let the  at the desk know you are there for surgery. A staff member will come escort you to the surgical area on the second floor.     If you have any questions or concerns, please do not hesitate to call:     (Prior to the day of surgery) [facility] department:  [phone]   (Day of surgery) Pre-Op department:  [phone]    These surgical instructions were reviewed with the patient during the PAT phone call.

## 2021-12-13 ENCOUNTER — ANESTHESIA EVENT (OUTPATIENT)
Dept: SURGERY | Age: 36
DRG: 355 | End: 2021-12-13
Payer: MEDICARE

## 2021-12-13 ENCOUNTER — HOSPITAL ENCOUNTER (OUTPATIENT)
Dept: PREADMISSION TESTING | Age: 36
Discharge: HOME | DRG: 355 | End: 2021-12-13
Payer: MEDICARE

## 2021-12-13 DIAGNOSIS — K43.5 PARASTOMAL HERNIA WITHOUT OBSTRUCTION OR GANGRENE: ICD-10-CM

## 2021-12-13 DIAGNOSIS — K94.19 INTESTINAL STOMA PROLAPSE (HCC): ICD-10-CM

## 2021-12-13 LAB
ABO + RH BLD: NORMAL
BLOOD GROUP ANTIBODIES SERPL: NORMAL
SPECIMEN EXP DATE BLD: NORMAL

## 2021-12-13 PROCEDURE — 86901 BLOOD TYPING SEROLOGIC RH(D): CPT

## 2021-12-13 PROCEDURE — 36415 COLL VENOUS BLD VENIPUNCTURE: CPT

## 2021-12-14 ENCOUNTER — HOSPITAL ENCOUNTER (INPATIENT)
Age: 36
LOS: 1 days | Discharge: HOME | DRG: 355 | End: 2021-12-15
Attending: COLON & RECTAL SURGERY | Admitting: COLON & RECTAL SURGERY
Payer: MEDICARE

## 2021-12-14 ENCOUNTER — ANESTHESIA (OUTPATIENT)
Dept: SURGERY | Age: 36
DRG: 355 | End: 2021-12-14
Payer: MEDICARE

## 2021-12-14 DIAGNOSIS — K43.5 PARASTOMAL HERNIA WITHOUT OBSTRUCTION OR GANGRENE: Primary | ICD-10-CM

## 2021-12-14 LAB
AMPHET UR QL SCN: NEGATIVE
BARBITURATES UR QL SCN: NEGATIVE
BENZODIAZ UR QL: NEGATIVE
CANNABINOIDS UR QL SCN: POSITIVE
COCAINE UR QL SCN: NEGATIVE
HDSCOM,HDSCOM: ABNORMAL
METHADONE UR QL: NEGATIVE
OPIATES UR QL: NEGATIVE
PCP UR QL: NEGATIVE

## 2021-12-14 PROCEDURE — 77030018836 HC SOL IRR NACL ICUM -A: Performed by: COLON & RECTAL SURGERY

## 2021-12-14 PROCEDURE — 00790 ANES IPER UPR ABD NOS: CPT | Performed by: NURSE ANESTHETIST, CERTIFIED REGISTERED

## 2021-12-14 PROCEDURE — 0DJD8ZZ INSPECTION OF LOWER INTESTINAL TRACT, VIA NATURAL OR ARTIFICIAL OPENING ENDOSCOPIC: ICD-10-PCS | Performed by: COLON & RECTAL SURGERY

## 2021-12-14 PROCEDURE — 74011000250 HC RX REV CODE- 250: Performed by: COLON & RECTAL SURGERY

## 2021-12-14 PROCEDURE — 74011250636 HC RX REV CODE- 250/636: Performed by: ANESTHESIOLOGY

## 2021-12-14 PROCEDURE — 77030008771 HC TU NG SALEM SUMP -A: Performed by: ANESTHESIOLOGY

## 2021-12-14 PROCEDURE — 77030009848 HC PASSR SUT SET COOP -C: Performed by: COLON & RECTAL SURGERY

## 2021-12-14 PROCEDURE — 74011250636 HC RX REV CODE- 250/636

## 2021-12-14 PROCEDURE — 76010000882 HC OR TIME 5 TO 5.5HR INTENSV - TIER 2: Performed by: COLON & RECTAL SURGERY

## 2021-12-14 PROCEDURE — 77030040361 HC SLV COMPR DVT MDII -B: Performed by: COLON & RECTAL SURGERY

## 2021-12-14 PROCEDURE — C1781 MESH (IMPLANTABLE): HCPCS | Performed by: COLON & RECTAL SURGERY

## 2021-12-14 PROCEDURE — 77030008756 HC TU IRR SUC STRY -B: Performed by: COLON & RECTAL SURGERY

## 2021-12-14 PROCEDURE — 77030008608 HC TRCR ENDOSC SMTH AMR -B: Performed by: COLON & RECTAL SURGERY

## 2021-12-14 PROCEDURE — 77030018673: Performed by: COLON & RECTAL SURGERY

## 2021-12-14 PROCEDURE — 77030027138 HC INCENT SPIROMETER -A

## 2021-12-14 PROCEDURE — 77030040922 HC BLNKT HYPOTHRM STRY -A: Performed by: COLON & RECTAL SURGERY

## 2021-12-14 PROCEDURE — 77030002933 HC SUT MCRYL J&J -A: Performed by: COLON & RECTAL SURGERY

## 2021-12-14 PROCEDURE — 2709999900 HC NON-CHARGEABLE SUPPLY

## 2021-12-14 PROCEDURE — 74011250636 HC RX REV CODE- 250/636: Performed by: NURSE ANESTHETIST, CERTIFIED REGISTERED

## 2021-12-14 PROCEDURE — 74011000250 HC RX REV CODE- 250: Performed by: ANESTHESIOLOGY

## 2021-12-14 PROCEDURE — 77030019908 HC STETH ESOPH SIMS -A: Performed by: ANESTHESIOLOGY

## 2021-12-14 PROCEDURE — 77030020703 HC SEAL CANN DISP INTU -B: Performed by: COLON & RECTAL SURGERY

## 2021-12-14 PROCEDURE — 76210000017 HC OR PH I REC 1.5 TO 2 HR: Performed by: COLON & RECTAL SURGERY

## 2021-12-14 PROCEDURE — 74011250636 HC RX REV CODE- 250/636: Performed by: COLON & RECTAL SURGERY

## 2021-12-14 PROCEDURE — 00790 ANES IPER UPR ABD NOS: CPT | Performed by: ANESTHESIOLOGY

## 2021-12-14 PROCEDURE — 8E0W4CZ ROBOTIC ASSISTED PROCEDURE OF TRUNK REGION, PERCUTANEOUS ENDOSCOPIC APPROACH: ICD-10-PCS | Performed by: COLON & RECTAL SURGERY

## 2021-12-14 PROCEDURE — 77030022704 HC SUT VLOC COVD -B: Performed by: COLON & RECTAL SURGERY

## 2021-12-14 PROCEDURE — 77030035278 HC STPLR SEAL ENDOWR INTU -B: Performed by: COLON & RECTAL SURGERY

## 2021-12-14 PROCEDURE — 80307 DRUG TEST PRSMV CHEM ANLYZR: CPT

## 2021-12-14 PROCEDURE — 0WUF0JZ SUPPLEMENT ABDOMINAL WALL WITH SYNTHETIC SUBSTITUTE, OPEN APPROACH: ICD-10-PCS | Performed by: COLON & RECTAL SURGERY

## 2021-12-14 PROCEDURE — 2709999900 HC NON-CHARGEABLE SUPPLY: Performed by: COLON & RECTAL SURGERY

## 2021-12-14 PROCEDURE — 76060000041 HC ANESTHESIA 5 TO 5.5 HR: Performed by: COLON & RECTAL SURGERY

## 2021-12-14 PROCEDURE — 77030035489 HC REDUCR CANN ENDOWR INTU -C: Performed by: COLON & RECTAL SURGERY

## 2021-12-14 PROCEDURE — 77030010541: Performed by: COLON & RECTAL SURGERY

## 2021-12-14 PROCEDURE — 74011250637 HC RX REV CODE- 250/637: Performed by: NURSE ANESTHETIST, CERTIFIED REGISTERED

## 2021-12-14 PROCEDURE — 77030008683 HC TU ET CUF COVD -A: Performed by: ANESTHESIOLOGY

## 2021-12-14 PROCEDURE — 77030035277 HC OBTRTR BLDELSS DISP INTU -B: Performed by: COLON & RECTAL SURGERY

## 2021-12-14 PROCEDURE — 65270000029 HC RM PRIVATE

## 2021-12-14 PROCEDURE — 77030031139 HC SUT VCRL2 J&J -A: Performed by: COLON & RECTAL SURGERY

## 2021-12-14 PROCEDURE — 77030012799 HC TRCR GELPRT BLN AMR -B: Performed by: COLON & RECTAL SURGERY

## 2021-12-14 PROCEDURE — 74011250637 HC RX REV CODE- 250/637: Performed by: COLON & RECTAL SURGERY

## 2021-12-14 DEVICE — PHASIX ST MESH, 15 CM X 20 CM (6" X 8"), RECTANGLE
Type: IMPLANTABLE DEVICE | Site: ABDOMEN | Status: FUNCTIONAL
Brand: PHASIX

## 2021-12-14 RX ORDER — MAGNESIUM SULFATE HEPTAHYDRATE 40 MG/ML
2 INJECTION, SOLUTION INTRAVENOUS ONCE
Status: DISPENSED | OUTPATIENT
Start: 2021-12-14 | End: 2021-12-15

## 2021-12-14 RX ORDER — LIDOCAINE HYDROCHLORIDE 20 MG/ML
INJECTION, SOLUTION EPIDURAL; INFILTRATION; INTRACAUDAL; PERINEURAL AS NEEDED
Status: DISCONTINUED | OUTPATIENT
Start: 2021-12-14 | End: 2021-12-14 | Stop reason: HOSPADM

## 2021-12-14 RX ORDER — HYDROMORPHONE HYDROCHLORIDE 2 MG/ML
INJECTION, SOLUTION INTRAMUSCULAR; INTRAVENOUS; SUBCUTANEOUS AS NEEDED
Status: DISCONTINUED | OUTPATIENT
Start: 2021-12-14 | End: 2021-12-14 | Stop reason: HOSPADM

## 2021-12-14 RX ORDER — CELECOXIB 100 MG/1
100 CAPSULE ORAL 2 TIMES DAILY
Status: DISCONTINUED | OUTPATIENT
Start: 2021-12-14 | End: 2021-12-15 | Stop reason: HOSPADM

## 2021-12-14 RX ORDER — SODIUM CHLORIDE, SODIUM LACTATE, POTASSIUM CHLORIDE, CALCIUM CHLORIDE 600; 310; 30; 20 MG/100ML; MG/100ML; MG/100ML; MG/100ML
100 INJECTION, SOLUTION INTRAVENOUS CONTINUOUS
Status: DISCONTINUED | OUTPATIENT
Start: 2021-12-14 | End: 2021-12-15

## 2021-12-14 RX ORDER — FENTANYL CITRATE 50 UG/ML
INJECTION, SOLUTION INTRAMUSCULAR; INTRAVENOUS
Status: COMPLETED
Start: 2021-12-14 | End: 2021-12-14

## 2021-12-14 RX ORDER — ROCURONIUM BROMIDE 10 MG/ML
INJECTION, SOLUTION INTRAVENOUS AS NEEDED
Status: DISCONTINUED | OUTPATIENT
Start: 2021-12-14 | End: 2021-12-14 | Stop reason: HOSPADM

## 2021-12-14 RX ORDER — FENTANYL CITRATE 50 UG/ML
50 INJECTION, SOLUTION INTRAMUSCULAR; INTRAVENOUS AS NEEDED
Status: DISCONTINUED | OUTPATIENT
Start: 2021-12-14 | End: 2021-12-14 | Stop reason: HOSPADM

## 2021-12-14 RX ORDER — ARIPIPRAZOLE 10 MG/1
10 TABLET ORAL EVERY EVENING
Status: DISCONTINUED | OUTPATIENT
Start: 2021-12-15 | End: 2021-12-15 | Stop reason: HOSPADM

## 2021-12-14 RX ORDER — HYDROMORPHONE HYDROCHLORIDE 2 MG/ML
0.5 INJECTION, SOLUTION INTRAMUSCULAR; INTRAVENOUS; SUBCUTANEOUS AS NEEDED
Status: DISCONTINUED | OUTPATIENT
Start: 2021-12-14 | End: 2021-12-14 | Stop reason: HOSPADM

## 2021-12-14 RX ORDER — HEPARIN SODIUM 5000 [USP'U]/ML
5000 INJECTION, SOLUTION INTRAVENOUS; SUBCUTANEOUS EVERY 8 HOURS
Status: DISCONTINUED | OUTPATIENT
Start: 2021-12-14 | End: 2021-12-15 | Stop reason: HOSPADM

## 2021-12-14 RX ORDER — ACETAMINOPHEN 500 MG
1000 TABLET ORAL EVERY 6 HOURS
Status: DISCONTINUED | OUTPATIENT
Start: 2021-12-14 | End: 2021-12-15 | Stop reason: HOSPADM

## 2021-12-14 RX ORDER — BUPRENORPHINE HYDROCHLORIDE AND NALOXONE HYDROCHLORIDE DIHYDRATE 8; 2 MG/1; MG/1
1 TABLET SUBLINGUAL 2 TIMES DAILY
Status: DISCONTINUED | OUTPATIENT
Start: 2021-12-14 | End: 2021-12-15 | Stop reason: HOSPADM

## 2021-12-14 RX ORDER — FAMOTIDINE 20 MG/1
20 TABLET, FILM COATED ORAL ONCE
Status: COMPLETED | OUTPATIENT
Start: 2021-12-14 | End: 2021-12-14

## 2021-12-14 RX ORDER — FENTANYL CITRATE 50 UG/ML
100 INJECTION, SOLUTION INTRAMUSCULAR; INTRAVENOUS
Status: COMPLETED | OUTPATIENT
Start: 2021-12-14 | End: 2021-12-14

## 2021-12-14 RX ORDER — MIDAZOLAM HYDROCHLORIDE 1 MG/ML
INJECTION, SOLUTION INTRAMUSCULAR; INTRAVENOUS AS NEEDED
Status: DISCONTINUED | OUTPATIENT
Start: 2021-12-14 | End: 2021-12-14 | Stop reason: HOSPADM

## 2021-12-14 RX ORDER — SODIUM CHLORIDE 0.9 % (FLUSH) 0.9 %
5-40 SYRINGE (ML) INJECTION AS NEEDED
Status: DISCONTINUED | OUTPATIENT
Start: 2021-12-14 | End: 2021-12-14 | Stop reason: HOSPADM

## 2021-12-14 RX ORDER — MORPHINE SULFATE 2 MG/ML
2 INJECTION, SOLUTION INTRAMUSCULAR; INTRAVENOUS
Status: DISCONTINUED | OUTPATIENT
Start: 2021-12-14 | End: 2021-12-15 | Stop reason: HOSPADM

## 2021-12-14 RX ORDER — DIPHENHYDRAMINE HYDROCHLORIDE 50 MG/ML
25 INJECTION, SOLUTION INTRAMUSCULAR; INTRAVENOUS
Status: DISCONTINUED | OUTPATIENT
Start: 2021-12-14 | End: 2021-12-15 | Stop reason: HOSPADM

## 2021-12-14 RX ORDER — DEXAMETHASONE SODIUM PHOSPHATE 4 MG/ML
INJECTION, SOLUTION INTRA-ARTICULAR; INTRALESIONAL; INTRAMUSCULAR; INTRAVENOUS; SOFT TISSUE AS NEEDED
Status: DISCONTINUED | OUTPATIENT
Start: 2021-12-14 | End: 2021-12-14 | Stop reason: HOSPADM

## 2021-12-14 RX ORDER — BUPIVACAINE HYDROCHLORIDE 2.5 MG/ML
INJECTION, SOLUTION EPIDURAL; INFILTRATION; INTRACAUDAL AS NEEDED
Status: DISCONTINUED | OUTPATIENT
Start: 2021-12-14 | End: 2021-12-14 | Stop reason: HOSPADM

## 2021-12-14 RX ORDER — SUCCINYLCHOLINE CHLORIDE 20 MG/ML
INJECTION INTRAMUSCULAR; INTRAVENOUS AS NEEDED
Status: DISCONTINUED | OUTPATIENT
Start: 2021-12-14 | End: 2021-12-14 | Stop reason: HOSPADM

## 2021-12-14 RX ORDER — GABAPENTIN 300 MG/1
600 CAPSULE ORAL 3 TIMES DAILY
Status: DISCONTINUED | OUTPATIENT
Start: 2021-12-14 | End: 2021-12-15 | Stop reason: HOSPADM

## 2021-12-14 RX ORDER — DEXTROAMPHETAMINE SACCHARATE, AMPHETAMINE ASPARTATE, DEXTROAMPHETAMINE SULFATE AND AMPHETAMINE SULFATE 5; 5; 5; 5 MG/1; MG/1; MG/1; MG/1
20 TABLET ORAL 2 TIMES DAILY
Status: DISCONTINUED | OUTPATIENT
Start: 2021-12-14 | End: 2021-12-14

## 2021-12-14 RX ORDER — FENTANYL CITRATE 50 UG/ML
INJECTION, SOLUTION INTRAMUSCULAR; INTRAVENOUS AS NEEDED
Status: DISCONTINUED | OUTPATIENT
Start: 2021-12-14 | End: 2021-12-14 | Stop reason: HOSPADM

## 2021-12-14 RX ORDER — SODIUM CHLORIDE 0.9 % (FLUSH) 0.9 %
5-40 SYRINGE (ML) INJECTION EVERY 8 HOURS
Status: DISCONTINUED | OUTPATIENT
Start: 2021-12-14 | End: 2021-12-14 | Stop reason: HOSPADM

## 2021-12-14 RX ORDER — ONDANSETRON 2 MG/ML
INJECTION INTRAMUSCULAR; INTRAVENOUS AS NEEDED
Status: DISCONTINUED | OUTPATIENT
Start: 2021-12-14 | End: 2021-12-14 | Stop reason: HOSPADM

## 2021-12-14 RX ORDER — INSULIN LISPRO 100 [IU]/ML
INJECTION, SOLUTION INTRAVENOUS; SUBCUTANEOUS ONCE
Status: DISCONTINUED | OUTPATIENT
Start: 2021-12-14 | End: 2021-12-14 | Stop reason: HOSPADM

## 2021-12-14 RX ORDER — SODIUM CHLORIDE, SODIUM LACTATE, POTASSIUM CHLORIDE, CALCIUM CHLORIDE 600; 310; 30; 20 MG/100ML; MG/100ML; MG/100ML; MG/100ML
INJECTION, SOLUTION INTRAVENOUS
Status: DISCONTINUED | OUTPATIENT
Start: 2021-12-14 | End: 2021-12-14 | Stop reason: HOSPADM

## 2021-12-14 RX ORDER — ONDANSETRON 2 MG/ML
4 INJECTION INTRAMUSCULAR; INTRAVENOUS ONCE
Status: COMPLETED | OUTPATIENT
Start: 2021-12-14 | End: 2021-12-14

## 2021-12-14 RX ORDER — ONDANSETRON 2 MG/ML
4 INJECTION INTRAMUSCULAR; INTRAVENOUS
Status: DISCONTINUED | OUTPATIENT
Start: 2021-12-14 | End: 2021-12-15 | Stop reason: HOSPADM

## 2021-12-14 RX ORDER — PROPOFOL 10 MG/ML
INJECTION, EMULSION INTRAVENOUS AS NEEDED
Status: DISCONTINUED | OUTPATIENT
Start: 2021-12-14 | End: 2021-12-14 | Stop reason: HOSPADM

## 2021-12-14 RX ORDER — SODIUM CHLORIDE, SODIUM LACTATE, POTASSIUM CHLORIDE, CALCIUM CHLORIDE 600; 310; 30; 20 MG/100ML; MG/100ML; MG/100ML; MG/100ML
75 INJECTION, SOLUTION INTRAVENOUS CONTINUOUS
Status: DISCONTINUED | OUTPATIENT
Start: 2021-12-14 | End: 2021-12-14 | Stop reason: HOSPADM

## 2021-12-14 RX ORDER — SODIUM CHLORIDE, SODIUM LACTATE, POTASSIUM CHLORIDE, CALCIUM CHLORIDE 600; 310; 30; 20 MG/100ML; MG/100ML; MG/100ML; MG/100ML
50 INJECTION, SOLUTION INTRAVENOUS CONTINUOUS
Status: DISCONTINUED | OUTPATIENT
Start: 2021-12-14 | End: 2021-12-14 | Stop reason: HOSPADM

## 2021-12-14 RX ADMIN — ROCURONIUM BROMIDE 10 MG: 50 INJECTION INTRAVENOUS at 11:24

## 2021-12-14 RX ADMIN — SODIUM CHLORIDE, SODIUM LACTATE, POTASSIUM CHLORIDE, AND CALCIUM CHLORIDE 50 ML/HR: 600; 310; 30; 20 INJECTION, SOLUTION INTRAVENOUS at 09:20

## 2021-12-14 RX ADMIN — ROCURONIUM BROMIDE 45 MG: 50 INJECTION INTRAVENOUS at 10:11

## 2021-12-14 RX ADMIN — HYDROMORPHONE HYDROCHLORIDE 0.5 MG: 2 INJECTION, SOLUTION INTRAMUSCULAR; INTRAVENOUS; SUBCUTANEOUS at 15:37

## 2021-12-14 RX ADMIN — HYDROMORPHONE HYDROCHLORIDE 0.8 MG: 2 INJECTION, SOLUTION INTRAMUSCULAR; INTRAVENOUS; SUBCUTANEOUS at 14:22

## 2021-12-14 RX ADMIN — SODIUM CHLORIDE, SODIUM LACTATE, POTASSIUM CHLORIDE, AND CALCIUM CHLORIDE: 600; 310; 30; 20 INJECTION, SOLUTION INTRAVENOUS at 10:53

## 2021-12-14 RX ADMIN — LIDOCAINE HYDROCHLORIDE 40 MG: 20 INJECTION, SOLUTION EPIDURAL; INFILTRATION; INTRACAUDAL; PERINEURAL at 10:02

## 2021-12-14 RX ADMIN — HYDROMORPHONE HYDROCHLORIDE 0.5 MG: 2 INJECTION, SOLUTION INTRAMUSCULAR; INTRAVENOUS; SUBCUTANEOUS at 15:31

## 2021-12-14 RX ADMIN — GABAPENTIN 600 MG: 300 CAPSULE ORAL at 22:22

## 2021-12-14 RX ADMIN — SUCCINYLCHOLINE CHLORIDE 100 MG: 20 INJECTION, SOLUTION INTRAMUSCULAR; INTRAVENOUS at 10:02

## 2021-12-14 RX ADMIN — ROCURONIUM BROMIDE 10 MG: 50 INJECTION INTRAVENOUS at 10:47

## 2021-12-14 RX ADMIN — MIDAZOLAM HYDROCHLORIDE 1 MG: 2 INJECTION, SOLUTION INTRAMUSCULAR; INTRAVENOUS at 09:56

## 2021-12-14 RX ADMIN — FENTANYL CITRATE 100 MCG: 50 INJECTION INTRAMUSCULAR; INTRAVENOUS at 16:25

## 2021-12-14 RX ADMIN — ONDANSETRON 4 MG: 2 INJECTION INTRAMUSCULAR; INTRAVENOUS at 12:45

## 2021-12-14 RX ADMIN — ONDANSETRON 4 MG: 2 INJECTION INTRAMUSCULAR; INTRAVENOUS at 16:25

## 2021-12-14 RX ADMIN — HYDROMORPHONE HYDROCHLORIDE 0.4 MG: 2 INJECTION, SOLUTION INTRAMUSCULAR; INTRAVENOUS; SUBCUTANEOUS at 14:31

## 2021-12-14 RX ADMIN — ROCURONIUM BROMIDE 5 MG: 50 INJECTION INTRAVENOUS at 10:02

## 2021-12-14 RX ADMIN — HYDROMORPHONE HYDROCHLORIDE 1 MG: 2 INJECTION, SOLUTION INTRAMUSCULAR; INTRAVENOUS; SUBCUTANEOUS at 14:51

## 2021-12-14 RX ADMIN — SODIUM CHLORIDE, SODIUM LACTATE, POTASSIUM CHLORIDE, AND CALCIUM CHLORIDE: 600; 310; 30; 20 INJECTION, SOLUTION INTRAVENOUS at 10:12

## 2021-12-14 RX ADMIN — HYDROMORPHONE HYDROCHLORIDE 0.5 MG: 2 INJECTION, SOLUTION INTRAMUSCULAR; INTRAVENOUS; SUBCUTANEOUS at 15:24

## 2021-12-14 RX ADMIN — ROCURONIUM BROMIDE 10 MG: 50 INJECTION INTRAVENOUS at 14:22

## 2021-12-14 RX ADMIN — FAMOTIDINE 20 MG: 20 TABLET ORAL at 09:19

## 2021-12-14 RX ADMIN — FENTANYL CITRATE 100 MCG: 50 INJECTION, SOLUTION INTRAMUSCULAR; INTRAVENOUS at 15:14

## 2021-12-14 RX ADMIN — WATER 2 G: 1 INJECTION INTRAMUSCULAR; INTRAVENOUS; SUBCUTANEOUS at 10:14

## 2021-12-14 RX ADMIN — MORPHINE SULFATE 2 MG: 2 INJECTION, SOLUTION INTRAMUSCULAR; INTRAVENOUS at 20:24

## 2021-12-14 RX ADMIN — MORPHINE SULFATE 2 MG: 2 INJECTION, SOLUTION INTRAMUSCULAR; INTRAVENOUS at 23:29

## 2021-12-14 RX ADMIN — MIDAZOLAM HYDROCHLORIDE 1 MG: 2 INJECTION, SOLUTION INTRAMUSCULAR; INTRAVENOUS at 09:55

## 2021-12-14 RX ADMIN — ROCURONIUM BROMIDE 10 MG: 50 INJECTION INTRAVENOUS at 12:13

## 2021-12-14 RX ADMIN — HYDROMORPHONE HYDROCHLORIDE 1 MG: 2 INJECTION, SOLUTION INTRAMUSCULAR; INTRAVENOUS; SUBCUTANEOUS at 14:52

## 2021-12-14 RX ADMIN — SODIUM CHLORIDE, SODIUM LACTATE, POTASSIUM CHLORIDE, AND CALCIUM CHLORIDE 100 ML/HR: 600; 310; 30; 20 INJECTION, SOLUTION INTRAVENOUS at 19:41

## 2021-12-14 RX ADMIN — FENTANYL CITRATE 50 MCG: 50 INJECTION, SOLUTION INTRAMUSCULAR; INTRAVENOUS at 10:02

## 2021-12-14 RX ADMIN — FENTANYL CITRATE 50 MCG: 50 INJECTION, SOLUTION INTRAMUSCULAR; INTRAVENOUS at 10:20

## 2021-12-14 RX ADMIN — ACETAMINOPHEN 1000 MG: 500 TABLET ORAL at 20:41

## 2021-12-14 RX ADMIN — FENTANYL CITRATE 100 MCG: 50 INJECTION INTRAMUSCULAR; INTRAVENOUS at 15:54

## 2021-12-14 RX ADMIN — CELECOXIB 100 MG: 100 CAPSULE ORAL at 20:41

## 2021-12-14 RX ADMIN — HYDROMORPHONE HYDROCHLORIDE 0.4 MG: 2 INJECTION, SOLUTION INTRAMUSCULAR; INTRAVENOUS; SUBCUTANEOUS at 10:45

## 2021-12-14 RX ADMIN — HYDROMORPHONE HYDROCHLORIDE 0.4 MG: 2 INJECTION, SOLUTION INTRAMUSCULAR; INTRAVENOUS; SUBCUTANEOUS at 11:10

## 2021-12-14 RX ADMIN — DEXAMETHASONE SODIUM PHOSPHATE 4 MG: 4 INJECTION, SOLUTION INTRAMUSCULAR; INTRAVENOUS at 12:46

## 2021-12-14 RX ADMIN — PROPOFOL 200 MG: 10 INJECTION, EMULSION INTRAVENOUS at 10:02

## 2021-12-14 NOTE — H&P
HPI: Carleen Colmenares is a 39 y.o. male presenting with chief complain of parasomal hernia    Past Medical History:   Diagnosis Date    ADHD (attention deficit hyperactivity disorder)     Bone infection ([address]     Chronic pain     lower back    Crohn's disease ([address]     Crohn's disease involving stomach ([address]     Erectile dysfunction     Ill-defined condition     Crohn's    MRSA (methicillin resistant staph aureus) culture positive 3/27/2016    Psychiatric disorder     Anxiety, Bipolar    Stool color black     crohns       Past Surgical History:   Procedure Laterality Date    HX COLONOSCOPY      HX GI      low anterior resection    HX GI  07/15/2015    pelvic and abd abscess colostomy    HX OTHER SURGICAL  2015    drains placed due to abcess    TN ABDOMEN SURGERY PROC UNLISTED      rectum resection       Family History   Problem Relation Age of Onset    Hypertension Mother        Social History     Socioeconomic History    Marital status: SINGLE   Tobacco Use    Smoking status: Current Every Day Smoker     Packs/day: 1.00     Years: 15.00     Pack years: 15.00     Types: Cigarettes    Smokeless tobacco: Never Used    Tobacco comment: trying to [employer]    Vaping Use: Every day    Start date: 6/1/2020    Substances: Nicotine (6mg)   Substance and Sexual Activity    Alcohol use: Yes     Alcohol/week: 0.0 standard drinks     Comment: rare-one a year    Drug use: Yes     Types: Marijuana, Cocaine     Comment: marijuana daily due to pain     Sexual activity: Not Currently       Review of Systems - neg    Outpatient Medications Marked as Taking for the 12/14/21 encounter [facility] Encounter)   Medication Sig Dispense Refill    ARIPiprazole (ABILIFY) 10 mg tablet Take 10 mg by mouth nightly.  Vraylar 1.5 mg capsule Take 1.5 mg by mouth daily.       gabapentin (NEURONTIN) 600 mg tablet TAKE 1 TABLET BY MOUTH 3 TIMES A DAY      tadalafiL (CIALIS) 5 mg tablet Take 1 Tablet by mouth daily. 90 Tablet 3    sildenafil citrate (VIAGRA) 100 mg tablet Take 1 Tablet by mouth daily as needed for Erectile Dysfunction. 10 Tablet 3    buprenorphine-naloxone 8-2 mg subl by SubLINGual route daily.  dextroamphetamine-amphetamine (ADDERALL) 20 mg tablet TK 1 T PO BID      cyanocobalamin (VITAMIN B-12) 500 mcg tablet Take 500 mcg by mouth daily.  therapeutic multivitamin (THERAGRAN) tablet Take 1 Tab by mouth daily. Allergies   Allergen Reactions    Ciprofloxacin Other (comments)     Nerve damage in hands    Flagyl [Metronidazole] Other (comments)     Other reaction(s): neurological reaction  Pt states he has \"nerve damage\" to his hands  Nerve problem       There were no vitals filed for this visit. Physical Exam  Constitutional:       Appearance: He is well-developed. HENT:      Head: Normocephalic and atraumatic. Eyes:      Conjunctiva/sclera: Conjunctivae normal.   Abdominal:      General: There is no distension. Palpations: Abdomen is soft. There is no mass. Tenderness: There is no abdominal tenderness. Musculoskeletal:         General: Normal range of motion. Lymphadenopathy:      Cervical: No cervical adenopathy. Skin:     General: Skin is warm and dry. Neurological:      Sensory: No sensory deficit. Psychiatric:         Speech: Speech normal.         Assessment / Plan    parastmal hernia repair    The diagnoses and plan were discussed with the patient. All questions answered. Plan of care agreed to by all concerned.

## 2021-12-14 NOTE — ANESTHESIA POSTPROCEDURE EVALUATION
Procedure(s):  ROBOTIC ASSISTED PARASTOMAL HERNIA REPAIR WITH MESH LYSIS OF ADHESION COLONOSCOPY. general    Anesthesia Post Evaluation      Multimodal analgesia: multimodal analgesia used between 6 hours prior to anesthesia start to PACU discharge  Patient location during evaluation: PACU  Patient participation: complete - patient participated  Level of consciousness: sleepy but conscious  Pain management: adequate  Airway patency: patent  Anesthetic complications: no  Cardiovascular status: acceptable  Respiratory status: acceptable  Hydration status: acceptable  Post anesthesia nausea and vomiting:  controlled  Final Post Anesthesia Temperature Assessment:  Normothermia (36.0-37.5 degrees C)      INITIAL Post-op Vital signs:   Vitals Value Taken Time   /82 12/14/21 1559   Temp     Pulse 83 12/14/21 1601   Resp 20 12/14/21 1601   SpO2 95 % 12/14/21 1601   Vitals shown include unvalidated device data.

## 2021-12-14 NOTE — ANESTHESIA PREPROCEDURE EVALUATION
Relevant Problems   RESPIRATORY SYSTEM   (+) Moderate cigarette smoker (10-19 per day)      HEMATOLOGY   (+) Anemia   (+) Osteomyelitis of vertebra of sacral and sacrococcygeal region       Anesthetic History   No history of anesthetic complications            Review of Systems / Medical History  Patient summary reviewed and pertinent labs reviewed    Pulmonary  Within defined limits                 Neuro/Psych              Cardiovascular                  Exercise tolerance: >4 METS     GI/Hepatic/Renal  Within defined limits             Comments: H/O Crohn's disease. S/P Colostomy Endo/Other        Arthritis     Other Findings   Comments: Uses Marijuana regularly         Physical Exam    Airway  Mallampati: II  TM Distance: 4 - 6 cm  Neck ROM: normal range of motion   Mouth opening: Normal     Cardiovascular  Regular rate and rhythm,  S1 and S2 normal,  no murmur, click, rub, or gallop             Dental    Dentition: Full upper dentures  Comments: No lower teeth in mouth   Pulmonary  Breath sounds clear to auscultation               Abdominal  GI exam deferred       Other Findings            Anesthetic Plan    ASA: 2  Anesthesia type: general          Induction: Intravenous  Anesthetic plan and risks discussed with: Patient

## 2021-12-14 NOTE — PERIOP NOTES
TRANSFER - OUT REPORT:    Verbal report given to Lisa(name) on Miltno  being transferred to [address] [place] [address](unit) for routine post - op       Report consisted of patients Situation, Background, Assessment and   Recommendations(SBAR). Information from the following report(s) SBAR, Kardex, Procedure Summary, Intake/Output and MAR was reviewed with the receiving nurse. Lines:   Peripheral IV 12/14/21 Left; Posterior Hand (Active)   Site Assessment Clean, dry, & intact 12/14/21 1609   Phlebitis Assessment 0 12/14/21 1609   Infiltration Assessment 0 12/14/21 1609   Dressing Status Clean, dry, & intact 12/14/21 1609   Dressing Type Tape; Transparent 12/14/21 0911   Hub Color/Line Status Pink; Infusing 12/14/21 0911       Peripheral IV 12/14/21 Right Hand (Active)   Site Assessment Clean, dry, & intact 12/14/21 1609   Phlebitis Assessment 0 12/14/21 1609   Infiltration Assessment 0 12/14/21 1609   Dressing Status Clean, dry, & intact 12/14/21 1609        Opportunity for questions and clarification was provided.       Patient transported with:   [employer]

## 2021-12-14 NOTE — BRIEF OP NOTE
Brief Postoperative Note    Patient: Bhavna Carreno  YOB: 1985  MRN: [id number]    Date of Procedure: 12/14/2021     Pre-Op Diagnosis: K43.5 PARASTOMAL HERNIA WITHOUT OBSTRUCTION OR GANGRENE  K94.19 INSTESTINAL STOMA PROCEDURE    Post-Op Diagnosis: Same as preoperative diagnosis. Procedure(s):  ROBOTIC ASSISTED PARASTOMAL HERNIA REPAIR WITH MESH, Colonoscopy    Surgeon(s):  Ameena Reddy MD    Surgical Assistant: Surg Asst-1: Ross Muta    Anesthesia: General     Estimated Blood Loss (mL): less than 50     Complications: None    Specimens: * No specimens in log *     Implants:   Implant Name Type Inv.  Item Serial No.  Lot No. LRB No. Used Action   MESH SURG [id number] [facility] TECHN[employer]Y RECT [employer]AS[employer] - [id number]  MESH SURG [id number] [address] DAVOL_WD [id number] N/A 1 Implanted       Drains:   [REMOVED] Drain 10/04/16 Right Other (comment) (Removed)       Findings: parastomal hernia    [id number]    Electronically Signed by Casey Silva MD on 12/14/2021 at 3:25 PM

## 2021-12-15 VITALS
TEMPERATURE: 97.9 F | WEIGHT: 165.3 LBS | OXYGEN SATURATION: 98 % | RESPIRATION RATE: 22 BRPM | SYSTOLIC BLOOD PRESSURE: 151 MMHG | DIASTOLIC BLOOD PRESSURE: 87 MMHG | BODY MASS INDEX: 27.54 KG/M2 | HEIGHT: 65 IN | HEART RATE: 70 BPM

## 2021-12-15 LAB
ANION GAP SERPL CALC-SCNC: 6 MMOL/L (ref 3–18)
BUN SERPL-MCNC: 8 MG/DL (ref 7–18)
BUN/CREAT SERPL: 11 (ref 12–20)
CALCIUM SERPL-MCNC: 8.7 MG/DL (ref 8.5–10.1)
CHLORIDE SERPL-SCNC: 108 MMOL/L (ref 100–111)
CO2 SERPL-SCNC: 25 MMOL/L (ref 21–32)
CREAT SERPL-MCNC: 0.73 MG/DL (ref 0.6–1.3)
ERYTHROCYTE [DISTWIDTH] IN BLOOD BY AUTOMATED COUNT: 15.1 % (ref 11.6–14.5)
GLUCOSE SERPL-MCNC: 103 MG/DL (ref 74–99)
HCT VFR BLD AUTO: 42.2 % (ref 36–48)
HGB BLD-MCNC: 13 G/DL (ref 13–16)
MAGNESIUM SERPL-MCNC: 2.4 MG/DL (ref 1.6–2.6)
MCH RBC QN AUTO: 25.6 PG (ref 24–34)
MCHC RBC AUTO-ENTMCNC: 30.8 G/DL (ref 31–37)
MCV RBC AUTO: 83.2 FL (ref 78–100)
NRBC # BLD: 0 K/UL (ref 0–0.01)
NRBC BLD-RTO: 0 PER 100 WBC
PHOSPHATE SERPL-MCNC: 3.1 MG/DL (ref 2.5–4.9)
PLATELET # BLD AUTO: 354 K/UL (ref 135–420)
PMV BLD AUTO: 8.8 FL (ref 9.2–11.8)
POTASSIUM SERPL-SCNC: 3.9 MMOL/L (ref 3.5–5.5)
RBC # BLD AUTO: 5.07 M/UL (ref 4.35–5.65)
SODIUM SERPL-SCNC: 139 MMOL/L (ref 136–145)
WBC # BLD AUTO: 12.7 K/UL (ref 4.6–13.2)

## 2021-12-15 PROCEDURE — 2709999900 HC NON-CHARGEABLE SUPPLY

## 2021-12-15 PROCEDURE — 45378 DIAGNOSTIC COLONOSCOPY: CPT | Performed by: COLON & RECTAL SURGERY

## 2021-12-15 PROCEDURE — 80048 BASIC METABOLIC PNL TOTAL CA: CPT

## 2021-12-15 PROCEDURE — 83735 ASSAY OF MAGNESIUM: CPT

## 2021-12-15 PROCEDURE — 85027 COMPLETE CBC AUTOMATED: CPT

## 2021-12-15 PROCEDURE — 84100 ASSAY OF PHOSPHORUS: CPT

## 2021-12-15 PROCEDURE — 74011250636 HC RX REV CODE- 250/636: Performed by: COLON & RECTAL SURGERY

## 2021-12-15 PROCEDURE — 44238 UNLISTED LAPS PX INTESTINE: CPT | Performed by: COLON & RECTAL SURGERY

## 2021-12-15 PROCEDURE — 36415 COLL VENOUS BLD VENIPUNCTURE: CPT

## 2021-12-15 PROCEDURE — 74011250637 HC RX REV CODE- 250/637: Performed by: COLON & RECTAL SURGERY

## 2021-12-15 RX ORDER — GABAPENTIN 300 MG/1
600 CAPSULE ORAL 3 TIMES DAILY
Qty: 180 CAPSULE | Refills: 0 | Status: SHIPPED | OUTPATIENT
Start: 2021-12-15 | End: 2022-01-14

## 2021-12-15 RX ORDER — ACETAMINOPHEN 500 MG
1000 TABLET ORAL 3 TIMES DAILY
Qty: 42 TABLET | Refills: 0 | Status: SHIPPED | OUTPATIENT
Start: 2021-12-15 | End: 2021-12-22

## 2021-12-15 RX ORDER — OXYCODONE HYDROCHLORIDE 5 MG/1
5 TABLET ORAL
Qty: 40 TABLET | Refills: 0 | Status: SHIPPED | OUTPATIENT
Start: 2021-12-15 | End: 2021-12-21 | Stop reason: SDUPTHER

## 2021-12-15 RX ORDER — CELECOXIB 100 MG/1
100 CAPSULE ORAL 2 TIMES DAILY
Qty: 14 CAPSULE | Refills: 0 | Status: SHIPPED | OUTPATIENT
Start: 2021-12-15 | End: 2021-12-22

## 2021-12-15 RX ADMIN — MORPHINE SULFATE 2 MG: 2 INJECTION, SOLUTION INTRAMUSCULAR; INTRAVENOUS at 05:31

## 2021-12-15 RX ADMIN — ACETAMINOPHEN 1000 MG: 500 TABLET ORAL at 01:29

## 2021-12-15 RX ADMIN — HEPARIN SODIUM 5000 UNITS: 5000 INJECTION INTRAVENOUS; SUBCUTANEOUS at 05:34

## 2021-12-15 RX ADMIN — CELECOXIB 100 MG: 100 CAPSULE ORAL at 08:13

## 2021-12-15 RX ADMIN — MORPHINE SULFATE 2 MG: 2 INJECTION, SOLUTION INTRAMUSCULAR; INTRAVENOUS at 14:41

## 2021-12-15 RX ADMIN — ACETAMINOPHEN 1000 MG: 500 TABLET ORAL at 11:44

## 2021-12-15 RX ADMIN — SODIUM CHLORIDE, SODIUM LACTATE, POTASSIUM CHLORIDE, AND CALCIUM CHLORIDE 100 ML/HR: 600; 310; 30; 20 INJECTION, SOLUTION INTRAVENOUS at 05:42

## 2021-12-15 RX ADMIN — MORPHINE SULFATE 2 MG: 2 INJECTION, SOLUTION INTRAMUSCULAR; INTRAVENOUS at 02:45

## 2021-12-15 RX ADMIN — GABAPENTIN 600 MG: 300 CAPSULE ORAL at 08:13

## 2021-12-15 RX ADMIN — MORPHINE SULFATE 2 MG: 2 INJECTION, SOLUTION INTRAMUSCULAR; INTRAVENOUS at 11:44

## 2021-12-15 RX ADMIN — HEPARIN SODIUM 5000 UNITS: 5000 INJECTION INTRAVENOUS; SUBCUTANEOUS at 11:44

## 2021-12-15 RX ADMIN — ACETAMINOPHEN 1000 MG: 500 TABLET ORAL at 06:15

## 2021-12-15 RX ADMIN — MORPHINE SULFATE 2 MG: 2 INJECTION, SOLUTION INTRAMUSCULAR; INTRAVENOUS at 08:13

## 2021-12-15 NOTE — ROUTINE PROCESS
Patient requested pain medication before discharge. Provider contacted and advised to give patient medication and to discharge.

## 2021-12-15 NOTE — PROGRESS NOTES
Problem: Falls - Risk of  Goal: *Absence of Falls  Description: Document Leafy Aquino Fall Risk and appropriate interventions in the flowsheet. Outcome: Progressing Towards Goal  Note: Fall Risk Interventions:  Mobility Interventions: Communicate number of staff needed for ambulation/transfer         Medication Interventions: Patient to call before getting OOB    Elimination Interventions: Call light in reach              Problem: Patient Education: Go to Patient Education Activity  Goal: Patient/Family Education  Outcome: Progressing Towards Goal     Problem: Pain  Goal: *Control of Pain  Outcome: Progressing Towards Goal     Problem: Patient Education: Go to Patient Education Activity  Goal: Patient/Family Education  Outcome: Progressing Towards Goal     Problem: Ostomy Care  Goal: *Patient pouching appliance will fit properly and maintain integrity at least three to five days  Description: Infection control procedures (eg, clean dressings, clean gloves, hand washing, precautions to isolate wound from contamination, sterile instruments used for wound debridement) should be implemented.   Outcome: Progressing Towards Goal  Goal: *Acceptance of change in body image  Outcome: Progressing Towards Goal     Problem: Patient Education: Go to Patient Education Activity  Goal: Patient/Family Education  Outcome: Progressing Towards Goal     Problem: Patient Education: Go to Patient Education Activity  Goal: Patient/Family Education  Outcome: Progressing Towards Goal     Problem: Surgical Pathway Day of Surgery  Goal: Activity/Safety  Outcome: Progressing Towards Goal  Goal: Consults, if ordered  Outcome: Progressing Towards Goal  Goal: Nutrition/Diet  Outcome: Progressing Towards Goal  Goal: Medications  Outcome: Progressing Towards Goal  Goal: Respiratory  Outcome: Progressing Towards Goal  Goal: Treatments/Interventions/Procedures  Outcome: Progressing Towards Goal  Goal: Psychosocial  Outcome: Progressing Towards Goal  Goal: *No signs and symptoms of infection or wound complications  Outcome: Progressing Towards Goal  Goal: *Optimal pain control at patient's stated goal  Outcome: Progressing Towards Goal  Goal: *Adequate urinary output (equal to or greater than 30 milliliters/hour)  Description: Ambulatory Surgery patients voiding without difficulty.   Outcome: Progressing Towards Goal  Goal: *Hemodynamically stable  Outcome: Progressing Towards Goal  Goal: *Tolerating diet  Outcome: Progressing Towards Goal  Goal: *Demonstrates progressive activity  Outcome: Progressing Towards Goal

## 2021-12-15 NOTE — DISCHARGE SUMMARY
Colon and Rectal Surgery Discharge Summary     Patient: Claudia Garza MRN: [id number]  SSN: [id number]    YOB: 1985  Age: 39 y.o.   Sex: male       Admit Date: 12/14/2021    Discharge Date: 12/15/2021      Admission Diagnoses: Parastomal hernia [K43.5]    Discharge Diagnoses: Same    Procedure: Robotic Parastomal Hernia Repair    Problem List as of 12/15/2021 Date Reviewed: 11/1/2021          Codes Class Noted - Resolved    Parastomal hernia ICD-10-CM: K43.5  ICD-9-CM: 569.69  12/14/2021 - Present        Status AP Resection for Crohn's Disease ICD-10-CM: Z98.890  ICD-9-CM: V45.89  5/16/2019 - Present        Peristomal skin breakdown ICD-10-CM: [id number]  ICD-9-CM: 707.9  10/26/2016 - Present        Spondylosis of lumbar region without myelopathy or radiculopathy ICD-10-CM: M47.816  ICD-9-CM: 721.3  12/14/2015 - Present        Lumbar neuritis ICD-10-CM: M54.16  ICD-9-CM: 724.4  12/14/2015 - Present        Blood pressure elevated without history of HTN ICD-10-CM: R03.0  ICD-9-CM: 796.2  11/19/2015 - Present        Moderate cigarette smoker (10-19 per day) ICD-10-CM: F17.210  ICD-9-CM: 305.1  11/19/2015 - Present        Prenatal consult ICD-10-CM: Z71.89  ICD-9-CM: V65.8  11/19/2015 - Present        Post-procedural erectile dysfunction ICD-10-CM: N52.39  ICD-9-CM: 607.84  10/23/2015 - Present        Crohn's disease ([address] ICD-10-CM: K50.90  ICD-9-CM: 555.9  7/14/2015 - Present        Chronic abdominal pain ICD-10-CM: R10.9, G89.29  ICD-9-CM: 789.00, 338.29  7/1/2015 - Present        Encounter for long-term (current) use of other medications ICD-10-CM: Z79.899  ICD-9-CM: V58.69  7/1/2015 - Present        Chronic pain syndrome ICD-10-CM: G89.4  ICD-9-CM: 338.4  7/1/2015 - Present        History of Crohn's disease ICD-10-CM: Z87.19  ICD-9-CM: V12.70  7/1/2015 - Present        History of anxiety ICD-10-CM: Z86.59  ICD-9-CM: V11.8  7/1/2015 - Present        Abscess ICD-10-CM: L02.91  ICD-9-CM: 682.9 2/14/2012 - Present    Overview Signed 4/9/2020 10:47 AM by [facility]     Overview:   presacral             Abscess of sacrum ([facility] ICD-10-CM: R94.29  ICD-9-CM: 730.08  2/14/2012 - Present    Overview Signed 4/9/2020 10:47 AM by Charley Bland R     Overview:   Possible pre-sacral abscess             Osteomyelitis of vertebra of sacral and sacrococcygeal region ICD-10-CM: M46.28  ICD-9-CM: 730.28  2/14/2012 - Present        Anemia ICD-10-CM: D64.9  ICD-9-CM: 285.9  10/14/2011 - Present        Partial small bowel obstruction ([facility] ICD-10-CM: K56.600  ICD-9-CM: 560.9  10/14/2011 - Present        RESOLVED: Perineal abscess ICD-10-CM: L02.215  ICD-9-CM: 682.2  11/23/2016 - 8/31/2020        RESOLVED: Perirectal abscess ICD-10-CM: K61.1  ICD-9-CM: 041  5/29/2015 - 11/23/2016        RESOLVED: Rectal fistula ICD-10-CM: K60.4  ICD-9-CM: 565.1  10/14/2011 - 8/31/2020    Overview Signed 4/9/2020 10:47 AM by [facility]     Overview:   Sacral fistula pre-sacral abscess, rectal to sacrococcygeal fistula and abscess                    Discharge Condition: Good    Hospital Course: To OR for above. Diet advanced. Afebrile. Cruz removed, pt voided. Consults: None    Significant Diagnostic Studies: NA    Disposition: home    Discharge Medications:   Current Discharge Medication List      START taking these medications    Details   gabapentin (NEURONTIN) 300 mg capsule Take 2 Capsules by mouth three (3) times daily for 30 days. Max Daily Amount: 1,800 mg. Qty: 180 Capsule, Refills: 0    Associated Diagnoses: Parastomal hernia without obstruction or gangrene      celecoxib (CELEBREX) 100 mg capsule Take 1 Capsule by mouth two (2) times a day for 7 days. Qty: 14 Capsule, Refills: 0      oxyCODONE IR (ROXICODONE) 5 mg immediate release tablet Take 1 Tablet by mouth every four (4) hours as needed for Pain for up to 7 days.  Max Daily Amount: 30 mg.  Qty: 40 Tablet, Refills: 0    Associated Diagnoses: Parastomal hernia without obstruction or gangrene      acetaminophen (TYLENOL) 500 mg tablet Take 2 Tablets by mouth three (3) times daily for 7 days. Qty: 42 Tablet, Refills: 0         CONTINUE these medications which have NOT CHANGED    Details   Vraylar 1.5 mg capsule Take 1.5 mg by mouth daily. buprenorphine-naloxone 8-2 mg subl by SubLINGual route two (2) times a day. Comments: .      cyanocobalamin (VITAMIN B-12) 500 mcg tablet Take 500 mcg by mouth daily. ARIPiprazole (ABILIFY) 10 mg tablet Take 10 mg by mouth nightly.      gabapentin (NEURONTIN) 600 mg tablet TAKE 1 TABLET BY MOUTH 3 TIMES A DAY      tadalafiL (CIALIS) 5 mg tablet Take 1 Tablet by mouth daily. Qty: 90 Tablet, Refills: 3      sildenafil citrate (VIAGRA) 100 mg tablet Take 1 Tablet by mouth daily as needed for Erectile Dysfunction. Qty: 10 Tablet, Refills: 3      dextroamphetamine-amphetamine (ADDERALL) 20 mg tablet TK 1 T PO BID      therapeutic multivitamin (THERAGRAN) tablet Take 1 Tab by mouth daily. Activity: No heavy lifting for 6 weeks  Diet: Regular Diet  Wound Care: None needed    Follow-up Appointments   Procedures    FOLLOW UP VISIT Appointment in: Two Weeks     Standing Status:   Standing     Number of Occurrences:   1     Order Specific Question:   Appointment in     Answer:    Two Weeks       Signed By: Barbara Ferrell MD     December 15, 2021

## 2021-12-15 NOTE — OP NOTES
[address] [place] [address]   OPERATIVE REPORT    Name:  Elysia Thomas  MR#:   [id number]  :  1985  ACCOUNT #:  [de-identified]  DATE OF SERVICE:  2021    PREOPERATIVE DIAGNOSIS:  Parastomal hernia. POSTOPERATIVE DIAGNOSIS:  Parastomal hernia. PROCEDURES PERFORMED:  Robotic-assisted parastomal hernia repair with mesh (Sugarbaker technique) and colonoscopy. SURGEON:  Alberto Cristobal. Soraya Sparks MD.    Manoj Brown. ANESTHESIA:  General.    COMPLICATIONS:  None. SPECIMENS REMOVED:  None. IMPLANTS:  Phasix mesh. ESTIMATED BLOOD LOSS:  20 mL. FINDINGS:  Parastomal hernia. INDICATIONS:  This patient is a 69-year-old male with a history of Crohn's proctectomy with end colostomy. He has a parastomal hernia causing significant pain. He was brought to the operating room for parastomal hernia repair. He also had some prolapse, which I told him might have been contributing to his pain as well. I explained the risks to the patient including bleeding, infection, infection of the mesh, fistula to the mesh, and chronic pain from mesh. He understood and wished to proceed. I told him additionally that given his history of Crohn's disease, fistula was a possibility. We discussed the use of a biologic mesh, but I told him that given the higher recurrence rates, he may be better off with a synthetic mesh. He understood all of these risks and wished to proceed, as the discomfort from his colostomy site was causing him to have the inability to work. DESCRIPTION OF PROCEDURE:  The patient was properly identified in the holding area and brought to the operating room. He was laid supine on the operating room table. General anesthesia was administered. Cruz catheter was placed. Arms were tucked to the sides. Colonoscopy was performed via the stoma to the ascending colon.  It was somewhat limited by stool, however there was no gross evidence of inflammation to suggest active crohn's. The colonoscope was withdrawn and removed from the patient. Abdomen was prepped and draped in the usual sterile fashion. We performed a Mehul cutdown technique in the right upper quadrant, placed a 12 mm port, insufflated the abdomen to 15 mmHg, and placed two 8 mm robotic ports on the right lateral and right lower quadrants. There was omental adhesive disease to the anterior abdominal wall, which were taken down. There was no substantial small bowel adhesive disease. We next circumferentially dissected around the colostomy site and identified the hernia, which was lateral to the colostomy site. We closed this with a running 0 V-Loc suture. Subsequent to this, we placed a 15 x 15 piece of a Phasix ST mesh in the abdominal cavity, marking the underside to be sure this was in proper orientation. We tacked this to the anterior abdominal wall with four 3-0 Vicryl sutures. We then circumferentially sutured around the colon and mesentery of the colon with a running 2-0 V-Loc suture. We then carried around a second layer of suturing around the edge of the mesh with running 2-0 V-Loc suture. We made sure that the exit area of the lateralized colon was just wide enough to allow for the colon, but small amounts to prevent any further herniation of the intestine. Upon completion of this, the mesh was snugly in place with no significant tension, which might obstruct the colon. All ports were then removed under direct visualization. We closed the right upper quadrant port at the level of the fascia with 0 Vicryl suture. Subcutaneous tissues were irrigated. Skin incisions were closed with 4-0 Monocryl subcuticular suture. Steri-Strips were applied. It should also be noted that at the beginning of the case, after the colonoscopy, the colostomy was sewn closed with a 2-0 Vicryl suture and a Ray-Nunu and Tegaderm were placed on top of this prior to prepping the abdomen.   At the conclusion of the case, this suture was removed and a colostomy appliance was applied. The patient tolerated the procedure well. All instrument, sponge, and needle counts were correct at the end of the case x2. The patient was awoken from anesthesia. He was extubated and transported to the PACU in stable condition.       Ketty Tobin MD      [id number]  D:  12/15/2021 10:46  T:  12/15/2021 11:45  JOB #:  [id number]

## 2021-12-15 NOTE — ROUTINE PROCESS
Received report from [facility]. Pt asleep, easily awakened, NAD, breathing non labored, on room air, HOB up. IV sites clean, dry and intact. IVF going per order. Trocar sites w/ steri strips in place. Colostomy noted w/ no drainage. Cruz cath in place. Bed at the lowest level on lock position, call bell w/i reach. Bedside and Verbal shift change report given to [facility] (oncoming nurse) by me (offgoing nurse). Report included the following information SBAR, Kardex, Procedure Summary, Intake/Output, MAR and Recent Results.

## 2021-12-15 NOTE — PROGRESS NOTES
Problem: Falls - Risk of  Goal: *Absence of Falls  Description: Document [facility]ri [facility] Fall Risk and appropriate interventions in the flowsheet. Outcome: Progressing Towards Goal  Note: Fall Risk Interventions:  Mobility Interventions: Assess mobility with egress test, Communicate number of staff needed for ambulation/transfer, Patient to call before getting OOB, OT consult for ADLs, Strengthening exercises (ROM-active/passive), Utilize walker, cane, or other assistive device         Medication Interventions: Assess postural VS orthostatic hypotension, Evaluate medications/consider consulting pharmacy, Patient to call before getting OOB, Teach patient to arise slowly    Elimination Interventions: Call light in reach, Patient to call for help with toileting needs, Toileting schedule/hourly rounds              Problem: Patient Education: Go to Patient Education Activity  Goal: Patient/Family Education  Outcome: Progressing Towards Goal     Problem: Pain  Goal: *Control of Pain  Outcome: Progressing Towards Goal     Problem: Patient Education: Go to Patient Education Activity  Goal: Patient/Family Education  Outcome: Progressing Towards Goal     Problem: Ostomy Care  Goal: *Patient pouching appliance will fit properly and maintain integrity at least three to five days  Description: Infection control procedures (eg, clean dressings, clean gloves, hand washing, precautions to isolate wound from contamination, sterile instruments used for wound debridement) should be implemented.   Outcome: Progressing Towards Goal  Goal: *Acceptance of change in body image  Outcome: Progressing Towards Goal     Problem: Patient Education: Go to Patient Education Activity  Goal: Patient/Family Education  Outcome: Progressing Towards Goal     Problem: Patient Education: Go to Patient Education Activity  Goal: Patient/Family Education  Outcome: Progressing Towards Goal     Problem: Surgical Pathway Day of Surgery  Goal: Activity/Safety  Outcome: Progressing Towards Goal  Goal: Consults, if ordered  Outcome: Progressing Towards Goal  Goal: Nutrition/Diet  Outcome: Progressing Towards Goal  Goal: Medications  Outcome: Progressing Towards Goal  Goal: Respiratory  Outcome: Progressing Towards Goal  Goal: Treatments/Interventions/Procedures  Outcome: Progressing Towards Goal  Goal: Psychosocial  Outcome: Progressing Towards Goal  Goal: *No signs and symptoms of infection or wound complications  Outcome: Progressing Towards Goal  Goal: *Optimal pain control at patient's stated goal  Outcome: Progressing Towards Goal  Goal: *Adequate urinary output (equal to or greater than 30 milliliters/hour)  Description: Ambulatory Surgery patients voiding without difficulty.   Outcome: Progressing Towards Goal  Goal: *Hemodynamically stable  Outcome: Progressing Towards Goal  Goal: *Tolerating diet  Outcome: Progressing Towards Goal  Goal: *Demonstrates progressive activity  Outcome: Progressing Towards Goal

## 2021-12-15 NOTE — DISCHARGE INSTRUCTIONS
DISCHARGE SUMMARY from Nurse    PATIENT INSTRUCTIONS:    After general anesthesia or intravenous sedation, for 24 hours or while taking prescription Narcotics:  · Limit your activities  · Do not drive and operate hazardous machinery  · Do not make important personal or business decisions  · Do  not drink alcoholic beverages  · If you have not urinated within 8 hours after discharge, please contact your surgeon on call. Report the following to your surgeon:  · Excessive pain, swelling, redness or odor of or around the surgical area  · Temperature over 100.5  · Nausea and vomiting lasting longer than 4 hours or if unable to take medications  · Any signs of decreased circulation or nerve impairment to extremity: change in color, persistent  numbness, tingling, coldness or increase pain  · Any questions    What to do at Home:  Recommended activity: Activity as tolerated, ***    If you experience any of the following symptoms chest pain, shortness of breath, fever greater than 100.5, nausea, vomiting, pain unrelieved by medication, please follow up with Dr Manish Browning. *  Please give a list of your current medications to your Primary Care Provider. *  Please update this list whenever your medications are discontinued, doses are      changed, or new medications (including over-the-counter products) are added. *  Please carry medication information at all times in case of emergency situations. These are general instructions for a healthy lifestyle:    No smoking/ No tobacco products/ Avoid exposure to second hand smoke  Surgeon General's Warning:  Quitting smoking now greatly reduces serious risk to your health.     Obesity, smoking, and sedentary lifestyle greatly increases your risk for illness    A healthy diet, regular physical exercise & weight monitoring are important for maintaining a healthy lifestyle    You may be retaining fluid if you have a history of heart failure or if you experience any of the following symptoms:  Weight gain of 3 pounds or more overnight or 5 pounds in a week, increased swelling in our hands or feet or shortness of breath while lying flat in bed. Please call your doctor as soon as you notice any of these symptoms; do not wait until your next office visit. Patient armband removed and shredded      The discharge information has been reviewed with the {PATIENT PARENT GUARDIAN:28632}. The {PATIENT PARENT GUARDIAN:50495} verbalized understanding. Discharge medications reviewed with the {D[employer]car[employer] meds reviewed LWAF:72468} and appropriate educational materials and side effects teaching were provided. ___________________________________________________________________________________________________________________________________  DISCHARGE SUMMARY from Nurse    PATIENT INSTRUCTIONS:    After general anesthesia or intravenous sedation, for 24 hours or while taking prescription Narcotics:  · Limit your activities  · Do not drive and operate hazardous machinery  · Do not make important personal or business decisions  · Do  not drink alcoholic beverages  · If you have not urinated within 8 hours after discharge, please contact your surgeon on call. Report the following to your surgeon:  · Excessive pain, swelling, redness or odor of or around the surgical area  · Temperature over 100.5  · Nausea and vomiting lasting longer than 4 hours or if unable to take medications  · Any signs of decreased circulation or nerve impairment to extremity: change in color, persistent  numbness, tingling, coldness or increase pain  · Any questions    What to do at Home:  Recommended activity: Activity as tolerated, ***    If you experience any of the following symptoms chest pain, nausea, vomiting, fever greater than 100.5, pain unrelieved by medication, please follow up with Diana Cooley. *  Please give a list of your current medications to your Primary Care Provider.     *  Please update this list whenever your medications are discontinued, doses are      changed, or new medications (including over-the-counter products) are added. *  Please carry medication information at all times in case of emergency situations. These are general instructions for a healthy lifestyle:    No smoking/ No tobacco products/ Avoid exposure to second hand smoke  Surgeon General's Warning:  Quitting smoking now greatly reduces serious risk to your health. Obesity, smoking, and sedentary lifestyle greatly increases your risk for illness    A healthy diet, regular physical exercise & weight monitoring are important for maintaining a healthy lifestyle    You may be retaining fluid if you have a history of heart failure or if you experience any of the following symptoms:  Weight gain of 3 pounds or more overnight or 5 pounds in a week, increased swelling in our hands or feet or shortness of breath while lying flat in bed. Please call your doctor as soon as you notice any of these symptoms; do not wait until your next office visit. Patient {ARMBANDS:95318}  [employer] Activation    Thank you for requesting access to [employer]. Please follow the instructions below to securely access and download your online medical record. [employer] allows you to send messages to your doctor, view your test results, renew your prescriptions, schedule appointments, and more. How Do I Sign Up? 1. In your internet browser, go to [email or web address]  2. Click on the First Time User? Click Here link in the Sign In box. You will be redirect to the New Member Sign Up page. 3. Enter your [employer] Access Code exactly as it appears below. You will not need to use this code after youve completed the sign-up process. If you do not sign up before the expiration date, you must request a new code. [employer] Access Code: [email or web address]  Expires: 2022 12:46 PM (This is the date your [employer] access code will )    4.  Enter the last four digits of your Social Security Number (xxxx) and Date of Birth (mm/dd/yyyy) as indicated and click Submit. You will be taken to the next sign-up page. 5. Create a [facility] ID. This will be your [facility] login ID and cannot be changed, so think of one that is secure and easy to remember. 6. Create a [facility] password. You can change your password at any time. 7. Enter your Password Reset Question and Answer. This can be used at a later time if you forget your password. 8. Enter your e-mail address. You will receive e-mail notification when new information is available in [address] 9. Click Sign Up. You can now view and download portions of your medical record. 10. Click the Download Summary menu link to download a portable copy of your medical information. Additional Information    If you have questions, please visit the Frequently Asked Questions section of the [facility] website at [email or web address] Remember, [facility] is NOT to be used for urgent needs. For medical emergencies, dial 911. The discharge information has been reviewed with the {PATIENT PARENT GUARDIAN:89274}. The {PATIENT PARENT GUARDIAN:37929} verbalized understanding. Discharge medications reviewed with the {Dishcarge meds reviewed PZNW:47156} and appropriate educational materials and side effects teaching were provided.   ___________________________________________________________________________________________________________________________________No heavy lifting (< 15 lbs only)  Showers ok in 2 days  Roxicodone, tylenol, celebrex and neurontin for pain  Follow up in office 2 weeks

## 2021-12-15 NOTE — PROGRESS NOTES
Problem: Falls - Risk of  Goal: *Absence of Falls  Description: Document [place] Fall Risk and appropriate interventions in the flowsheet. 12/15/2021 1048 by Bernadette Alaniz RN  Outcome: Progressing Towards Goal  Note: Fall Risk Interventions:  Mobility Interventions: Communicate number of staff needed for ambulation/transfer         Medication Interventions: Patient to call before getting OOB    Elimination Interventions: Call light in reach           12/15/2021 1040 by Bernadette Alaniz RN  Outcome: Progressing Towards Goal  Note: Fall Risk Interventions:  Mobility Interventions: Communicate number of staff needed for ambulation/transfer         Medication Interventions: Patient to call before getting OOB    Elimination Interventions: Call light in reach              Problem: Patient Education: Go to Patient Education Activity  Goal: Patient/Family Education  12/15/2021 1048 by Bernadette Alaniz RN  Outcome: Progressing Towards Goal  12/15/2021 1040 by Bernadette Alaniz RN  Outcome: Progressing Towards Goal     Problem: Pain  Goal: *Control of Pain  12/15/2021 1048 by Bernadette Alaniz RN  Outcome: Progressing Towards Goal  12/15/2021 1040 by Bernadette Alaniz RN  Outcome: Progressing Towards Goal     Problem: Patient Education: Go to Patient Education Activity  Goal: Patient/Family Education  12/15/2021 1048 by Bernadette Alaniz RN  Outcome: Progressing Towards Goal  12/15/2021 1040 by Bernadette Alaniz RN  Outcome: Progressing Towards Goal     Problem: Ostomy Care  Goal: *Patient pouching appliance will fit properly and maintain integrity at least three to five days  Description: Infection control procedures (eg, clean dressings, clean gloves, hand washing, precautions to isolate wound from contamination, sterile instruments used for wound debridement) should be implemented.   12/15/2021 1048 by Bernadette Alaniz RN  Outcome: Progressing Towards Goal  12/15/2021 1040 by Bernadette Alaniz RN  Outcome: Progressing Towards Goal  Goal: *Acceptance of change in body image  12/15/2021 1048 by Derik Echeverria RN  Outcome: Progressing Towards Goal  12/15/2021 1040 by Derik Echeverria RN  Outcome: Progressing Towards Goal     Problem: Patient Education: Go to Patient Education Activity  Goal: Patient/Family Education  12/15/2021 1048 by Derik Echeverria, RN  Outcome: Progressing Towards Goal  12/15/2021 1040 by Derik Echeverria RN  Outcome: Progressing Towards Goal     Problem: Patient Education: Go to Patient Education Activity  Goal: Patient/Family Education  12/15/2021 1048 by Derik Echeverria RN  Outcome: Progressing Towards Goal  12/15/2021 1040 by Derik Echeverria RN  Outcome: Progressing Towards Goal     Problem: Surgical Pathway Day of Surgery  Goal: Activity/Safety  12/15/2021 1048 by Derik Echeverria RN  Outcome: Progressing Towards Goal  12/15/2021 1040 by Derik Echeverria RN  Outcome: Progressing Towards Goal  Goal: Consults, if ordered  12/15/2021 1048 by Derik Echeverria, RN  Outcome: Progressing Towards Goal  12/15/2021 1040 by Derik Echeverria RN  Outcome: Progressing Towards Goal  Goal: Nutrition/Diet  12/15/2021 1048 by Derik Echeverria RN  Outcome: Progressing Towards Goal  12/15/2021 1040 by Derik Echeverria RN  Outcome: Progressing Towards Goal  Goal: Medications  12/15/2021 1048 by Derik Echeverria, RN  Outcome: Progressing Towards Goal  12/15/2021 1040 by Derik Echeverria RN  Outcome: Progressing Towards Goal  Goal: Respiratory  12/15/2021 1048 by Derik Echeverria, RN  Outcome: Progressing Towards Goal  12/15/2021 1040 by Derik Echeverria RN  Outcome: Progressing Towards Goal  Goal: Treatments/Interventions/Procedures  12/15/2021 1048 by Derik Echeverria, RN  Outcome: Progressing Towards Goal  12/15/2021 1040 by Derik Echeverria RN  Outcome: Progressing Towards Goal  Goal: Psychosocial  12/15/2021 1048 by Derik Echeverria RN  Outcome: Progressing Towards Goal  12/15/2021 1040 by Wes Dave RN  Outcome: Progressing Towards Goal  Goal: *No signs and symptoms of infection or wound complications  13/44/5024 1048 by Wes Dave RN  Outcome: Progressing Towards Goal  12/15/2021 1040 by Wes Dave RN  Outcome: Progressing Towards Goal  Goal: *Optimal pain control at patient's stated goal  12/15/2021 1048 by Wes Dave RN  Outcome: Progressing Towards Goal  12/15/2021 1040 by Wes Dave RN  Outcome: Progressing Towards Goal  Goal: *Adequate urinary output (equal to or greater than 30 milliliters/hour)  Description: Ambulatory Surgery patients voiding without difficulty.   12/15/2021 1048 by Wes Dave RN  Outcome: Progressing Towards Goal  12/15/2021 1040 by Wes Dave RN  Outcome: Progressing Towards Goal  Goal: *Hemodynamically stable  12/15/2021 1048 by Wes Dave RN  Outcome: Progressing Towards Goal  12/15/2021 1040 by Wes Dave RN  Outcome: Progressing Towards Goal  Goal: *Tolerating diet  12/15/2021 1048 by Wes Dave RN  Outcome: Progressing Towards Goal  12/15/2021 1040 by Wes Dave RN  Outcome: Progressing Towards Goal  Goal: *Demonstrates progressive activity  12/15/2021 1048 by Wes Dave RN  Outcome: Progressing Towards Goal  12/15/2021 1040 by Wes Dave RN  Outcome: Progressing Towards Goal

## 2021-12-15 NOTE — PROGRESS NOTES
Discharge order noted for today. Orders reviewed. No needs identified at this time.  remains available if needed.   Ewa Rocha RN - Outcomes Manager  [phone]

## 2021-12-15 NOTE — PROGRESS NOTES
Problem: Falls - Risk of  Goal: *Absence of Falls  Description: Document Bridgette Lobe Fall Risk and appropriate interventions in the flowsheet.   12/15/2021 1447 by Sai Ruggiero RN  Outcome: Resolved/Met  12/15/2021 1048 by Sai Ruggiero RN  Outcome: Progressing Towards Goal  Note: Fall Risk Interventions:  Mobility Interventions: Communicate number of staff needed for ambulation/transfer         Medication Interventions: Patient to call before getting OOB    Elimination Interventions: Call light in reach           12/15/2021 1040 by Sai Ruggiero RN  Outcome: Progressing Towards Goal  Note: Fall Risk Interventions:  Mobility Interventions: Communicate number of staff needed for ambulation/transfer         Medication Interventions: Patient to call before getting OOB    Elimination Interventions: Call light in reach              Problem: Patient Education: Go to Patient Education Activity  Goal: Patient/Family Education  12/15/2021 1447 by Sai Ruggiero RN  Outcome: Resolved/Met  12/15/2021 1048 by Sai Ruggiero RN  Outcome: Progressing Towards Goal  12/15/2021 1040 by Sai Ruggiero RN  Outcome: Progressing Towards Goal     Problem: Pain  Goal: *Control of Pain  12/15/2021 1447 by Sai Ruggiero RN  Outcome: Resolved/Met  12/15/2021 1048 by Sai Ruggiero RN  Outcome: Progressing Towards Goal  12/15/2021 1040 by Sai Ruggiero RN  Outcome: Progressing Towards Goal     Problem: Patient Education: Go to Patient Education Activity  Goal: Patient/Family Education  12/15/2021 1447 by Sai Ruggiero RN  Outcome: Resolved/Met  12/15/2021 1048 by Sai Ruggiero RN  Outcome: Progressing Towards Goal  12/15/2021 1040 by Sai Ruggiero RN  Outcome: Progressing Towards Goal     Problem: Ostomy Care  Goal: *Patient pouching appliance will fit properly and maintain integrity at least three to five days  Description: Infection control procedures (eg, clean dressings, clean gloves, hand washing, precautions to isolate wound from contamination, sterile instruments used for wound debridement) should be implemented.   12/15/2021 1447 by Stephanie Brown RN  Outcome: Resolved/Met  12/15/2021 1048 by Stephanie Brown RN  Outcome: Progressing Towards Goal  12/15/2021 1040 by Stephanie Brown RN  Outcome: Progressing Towards Goal  Goal: *Acceptance of change in body image  12/15/2021 1447 by Stephanie Brown RN  Outcome: Resolved/Met  12/15/2021 1048 by Stephanie Brown RN  Outcome: Progressing Towards Goal  12/15/2021 1040 by Stephanie Brown RN  Outcome: Progressing Towards Goal     Problem: Patient Education: Go to Patient Education Activity  Goal: Patient/Family Education  12/15/2021 1447 by Stephanei Brown RN  Outcome: Resolved/Met  12/15/2021 1048 by Stephanie Brown RN  Outcome: Progressing Towards Goal  12/15/2021 1040 by Stephanie Brown RN  Outcome: Progressing Towards Goal     Problem: Patient Education: Go to Patient Education Activity  Goal: Patient/Family Education  12/15/2021 1447 by Stephanie Brown RN  Outcome: Resolved/Met  12/15/2021 1048 by Stephanie Brown RN  Outcome: Progressing Towards Goal  12/15/2021 1040 by Stephanie Brown RN  Outcome: Progressing Towards Goal     Problem: Surgical Pathway Day of Surgery  Goal: Activity/Safety  12/15/2021 1447 by Stephanie Brown RN  Outcome: Resolved/Met  12/15/2021 1048 by Stephanie Brown RN  Outcome: Progressing Towards Goal  12/15/2021 1040 by Stephanie Brown RN  Outcome: Progressing Towards Goal  Goal: Consults, if ordered  12/15/2021 1447 by Stephanie Brown RN  Outcome: Resolved/Met  12/15/2021 1048 by Stephanie Brown RN  Outcome: Progressing Towards Goal  12/15/2021 1040 by Stephanie Brown RN  Outcome: Progressing Towards Goal  Goal: Nutrition/Diet  12/15/2021 1447 by Stephanie Brown RN  Outcome: Resolved/Met  12/15/2021 1048 by Stephanie Brown RN  Outcome: Progressing Towards Goal  12/15/2021 1040 by Gabriela Cuevas RN  Outcome: Progressing Towards Goal  Goal: Medications  12/15/2021 1447 by Gabriela Cuevas RN  Outcome: Resolved/Met  12/15/2021 1048 by Gabriela Cuevas RN  Outcome: Progressing Towards Goal  12/15/2021 1040 by Gabriela Cuevas RN  Outcome: Progressing Towards Goal  Goal: Respiratory  12/15/2021 1447 by Gabriela Cuevas RN  Outcome: Resolved/Met  12/15/2021 1048 by Gabriela Cuevas RN  Outcome: Progressing Towards Goal  12/15/2021 1040 by Gabriela Cuevas RN  Outcome: Progressing Towards Goal  Goal: Treatments/Interventions/Procedures  12/15/2021 1447 by Gabriela Cuevas RN  Outcome: Resolved/Met  12/15/2021 1048 by Gabriela Cuevas RN  Outcome: Progressing Towards Goal  12/15/2021 1040 by Gabriela Cuevas RN  Outcome: Progressing Towards Goal  Goal: Psychosocial  12/15/2021 1447 by Gabriela Cuevas RN  Outcome: Resolved/Met  12/15/2021 1048 by Gabriela Cuevas RN  Outcome: Progressing Towards Goal  12/15/2021 1040 by Gabriela Cuevas RN  Outcome: Progressing Towards Goal  Goal: *No signs and symptoms of infection or wound complications  83/81/8791 1447 by Gabriela Cuevas RN  Outcome: Resolved/Met  12/15/2021 1048 by Gabriela Cuevas RN  Outcome: Progressing Towards Goal  12/15/2021 1040 by Gabriela Cuveas RN  Outcome: Progressing Towards Goal  Goal: *Optimal pain control at patient's stated goal  12/15/2021 1447 by Gabriela Cuevas RN  Outcome: Resolved/Met  12/15/2021 1048 by Gabriela Cuevas RN  Outcome: Progressing Towards Goal  12/15/2021 1040 by Gabriela Cuevas RN  Outcome: Progressing Towards Goal  Goal: *Adequate urinary output (equal to or greater than 30 milliliters/hour)  Description: Ambulatory Surgery patients voiding without difficulty.   12/15/2021 1447 by Gabriela Cuevas RN  Outcome: Resolved/Met  12/15/2021 1048 by Gabriela Cuevas RN  Outcome: Progressing Towards Goal  12/15/2021 1040 by Gabriela Cuevas RN  Outcome: Progressing Towards Goal  Goal: *Hemodynamically stable  12/15/2021 1447 by Breann Collier RN  Outcome: Resolved/Met  12/15/2021 1048 by Breann Collier, RN  Outcome: Progressing Towards Goal  12/15/2021 1040 by Breann Collier, RN  Outcome: Progressing Towards Goal  Goal: *Tolerating diet  12/15/2021 1447 by Breann Collier, RN  Outcome: Resolved/Met  12/15/2021 1048 by Breann Collier RN  Outcome: Progressing Towards Goal  12/15/2021 1040 by Breann Collier RN  Outcome: Progressing Towards Goal  Goal: *Demonstrates progressive activity  12/15/2021 1447 by Breann Collier, RN  Outcome: Resolved/Met  12/15/2021 1048 by Breann Collier RN  Outcome: Progressing Towards Goal  12/15/2021 1040 by Breann Collier RN  Outcome: Progressing Towards Goal

## 2021-12-15 NOTE — PROGRESS NOTES
Reason for Admission:  Parastomal hernia [K43.5]                 RUR Score:    8%            Plan for utilizing home health:    no                      Likelihood of Readmission:   LOW                         Transition of Care Plan:              Initial assessment completed with patient. Cognitive status of patient: oriented to time, place, person and situation. Face sheet information confirmed:  yes. The patient designates mother, Rue All to participate in his discharge plan and to receive any needed information. This patient lives in a single family home with mother. Patient is able to navigate steps as needed. Prior to hospitalization, patient was considered to be independent with ADLs/IADLS : yes . Patient has a current ACP document on file: no      Healthcare Decision Maker:     Click here to complete [address] including selection of the Healthcare Decision Maker Relationship (ie \"Primary\")    The mother will be available to transport patient home upon discharge. The patient has no DME available in the home. Patient is not currently active with home health. Patient has not stayed in a skilled nursing facility or rehab. This patient is on dialysis :no    Currently, the discharge plan is Home. The patient states that he can obtain his medications from the pharmacy, and take his medications as directed. Patient's current insurance is [employer] and [facility] Medicaid. Care Management Interventions  PCP Verified by CM:  Yes  Mode of Transport at Discharge: Self  Transition of Care Consult (CM Consult): Discharge Planning  Support Systems: Parent(s)  Confirm Follow Up Transport: Family  Discharge Location  Discharge Placement: Home        Adriana Jarrell RN - Outcomes Manager  [phone]

## 2021-12-21 DIAGNOSIS — K43.5 PARASTOMAL HERNIA WITHOUT OBSTRUCTION OR GANGRENE: ICD-10-CM

## 2021-12-21 RX ORDER — OXYCODONE HYDROCHLORIDE 5 MG/1
5 TABLET ORAL
Qty: 40 TABLET | Refills: 0 | Status: SHIPPED | OUTPATIENT
Start: 2021-12-21 | End: 2021-12-28

## 2022-03-19 PROBLEM — Z98.890 POSTPROCEDURAL STATE: Status: ACTIVE | Noted: 2019-05-16

## 2022-03-19 PROBLEM — K43.5 PARASTOMAL HERNIA: Status: ACTIVE | Noted: 2021-12-14

## 2023-10-01 ENCOUNTER — HOSPITAL ENCOUNTER (EMERGENCY)
Facility: HOSPITAL | Age: 38
Discharge: HOME | End: 2023-10-01
Attending: EMERGENCY MEDICINE
Payer: COMMERCIAL

## 2023-10-01 VITALS
DIASTOLIC BLOOD PRESSURE: 105 MMHG | SYSTOLIC BLOOD PRESSURE: 161 MMHG | RESPIRATION RATE: 16 BRPM | HEART RATE: 98 BPM | BODY MASS INDEX: 23.66 KG/M2 | OXYGEN SATURATION: 100 % | TEMPERATURE: 98.2 F | WEIGHT: 142 LBS | HEIGHT: 65 IN

## 2023-10-01 DIAGNOSIS — L02.91 ABSCESS: Primary | ICD-10-CM

## 2023-10-01 PROCEDURE — 6370000000 HC RX 637 (ALT 250 FOR IP): Performed by: EMERGENCY MEDICINE

## 2023-10-01 PROCEDURE — 99283 EMERGENCY DEPT VISIT LOW MDM: CPT

## 2023-10-01 RX ORDER — OXYCODONE HYDROCHLORIDE AND ACETAMINOPHEN 5; 325 MG/1; MG/1
1 TABLET ORAL EVERY 6 HOURS PRN
Qty: 12 TABLET | Refills: 0 | Status: SHIPPED | OUTPATIENT
Start: 2023-10-01 | End: 2023-10-04

## 2023-10-01 RX ORDER — SULFAMETHOXAZOLE AND TRIMETHOPRIM 800; 160 MG/1; MG/1
2 TABLET ORAL EVERY 12 HOURS SCHEDULED
Status: DISCONTINUED | OUTPATIENT
Start: 2023-10-01 | End: 2023-10-01 | Stop reason: HOSPADM

## 2023-10-01 RX ORDER — OXYCODONE HYDROCHLORIDE AND ACETAMINOPHEN 5; 325 MG/1; MG/1
1 TABLET ORAL
Status: COMPLETED | OUTPATIENT
Start: 2023-10-01 | End: 2023-10-01

## 2023-10-01 RX ORDER — SULFAMETHOXAZOLE AND TRIMETHOPRIM 800; 160 MG/1; MG/1
2 TABLET ORAL 2 TIMES DAILY
Qty: 40 TABLET | Refills: 0 | Status: SHIPPED | OUTPATIENT
Start: 2023-10-01 | End: 2023-10-11

## 2023-10-01 RX ORDER — CEPHALEXIN 500 MG/1
1000 CAPSULE ORAL 2 TIMES DAILY
Qty: 40 CAPSULE | Refills: 0 | Status: SHIPPED | OUTPATIENT
Start: 2023-10-01 | End: 2023-10-11

## 2023-10-01 RX ORDER — CEPHALEXIN 250 MG/1
1000 CAPSULE ORAL
Status: COMPLETED | OUTPATIENT
Start: 2023-10-01 | End: 2023-10-01

## 2023-10-01 RX ADMIN — CEPHALEXIN 1000 MG: 250 CAPSULE ORAL at 14:27

## 2023-10-01 RX ADMIN — OXYCODONE AND ACETAMINOPHEN 1 TABLET: 5; 325 TABLET ORAL at 14:28

## 2023-10-01 ASSESSMENT — PAIN DESCRIPTION - ORIENTATION
ORIENTATION: LEFT
ORIENTATION: LEFT

## 2023-10-01 ASSESSMENT — PAIN - FUNCTIONAL ASSESSMENT
PAIN_FUNCTIONAL_ASSESSMENT: ACTIVITIES ARE NOT PREVENTED
PAIN_FUNCTIONAL_ASSESSMENT: 0-10

## 2023-10-01 ASSESSMENT — PAIN SCALES - GENERAL
PAINLEVEL_OUTOF10: 8
PAINLEVEL_OUTOF10: 7

## 2023-10-01 ASSESSMENT — PAIN DESCRIPTION - DESCRIPTORS: DESCRIPTORS: THROBBING

## 2023-10-01 ASSESSMENT — ENCOUNTER SYMPTOMS
GASTROINTESTINAL NEGATIVE: 1
EYES NEGATIVE: 1
RESPIRATORY NEGATIVE: 1

## 2023-10-01 ASSESSMENT — PAIN DESCRIPTION - LOCATION
LOCATION: ARM
LOCATION: ARM

## 2023-10-01 NOTE — ED TRIAGE NOTES
Pt c/o multiple abscess in bilateral arm per pt left arm is worse than the right arm, pt also fell on his left forearm 1 week ago.

## 2023-10-05 ENCOUNTER — HOSPITAL ENCOUNTER (EMERGENCY)
Facility: HOSPITAL | Age: 38
Discharge: HOME | End: 2023-10-05
Payer: COMMERCIAL

## 2023-10-05 VITALS
BODY MASS INDEX: 23.66 KG/M2 | WEIGHT: 142 LBS | DIASTOLIC BLOOD PRESSURE: 87 MMHG | SYSTOLIC BLOOD PRESSURE: 139 MMHG | HEIGHT: 65 IN | TEMPERATURE: 99.2 F | OXYGEN SATURATION: 99 % | RESPIRATION RATE: 18 BRPM | HEART RATE: 77 BPM

## 2023-10-05 DIAGNOSIS — L02.91 ABSCESS: Primary | ICD-10-CM

## 2023-10-05 PROCEDURE — 99283 EMERGENCY DEPT VISIT LOW MDM: CPT

## 2023-10-05 RX ORDER — HYDROCODONE BITARTRATE AND ACETAMINOPHEN 7.5; 325 MG/1; MG/1
1 TABLET ORAL 2 TIMES DAILY
Qty: 6 TABLET | Refills: 0 | Status: SHIPPED | OUTPATIENT
Start: 2023-10-05 | End: 2023-10-08

## 2023-10-05 ASSESSMENT — ENCOUNTER SYMPTOMS
NAUSEA: 0
DIARRHEA: 0
VOMITING: 0
SHORTNESS OF BREATH: 0
ABDOMINAL PAIN: 0

## 2023-10-05 ASSESSMENT — PAIN - FUNCTIONAL ASSESSMENT: PAIN_FUNCTIONAL_ASSESSMENT: NONE - DENIES PAIN

## 2023-10-05 NOTE — DISCHARGE INSTRUCTIONS
Take medication as prescribed. Follow-up with your primary care physician within 2 days for reassessment. Bring the results from this visit with you for their review. Return to the ED immediately for any new, worsening, or persistent symptom.

## (undated) DEVICE — KIT CLN UP BON SECOURS MARYV

## (undated) DEVICE — GARMENT,MEDLINE,DVT,INT,CALF,MED, GEN2: Brand: MEDLINE

## (undated) DEVICE — MAYO STAND COVER: Brand: CONVERTORS

## (undated) DEVICE — INTENDED FOR TISSUE SEPARATION, AND OTHER PROCEDURES THAT REQUIRE A SHARP SURGICAL BLADE TO PUNCTURE OR CUT.: Brand: BARD-PARKER ®  SAFETY SCALPED

## (undated) DEVICE — SET SUCT IRR TIP DISP STRYKEFLOW2

## (undated) DEVICE — COVER MPLR TIP CRV SCIS ACC DA VINCI

## (undated) DEVICE — BLADELESS OBTURATOR: Brand: WECK VISTA

## (undated) DEVICE — COLOSTOMY/ILEOSTOMY KIT,FLEXWEAR: Brand: NEW IMAGE

## (undated) DEVICE — BLANKET WRM AD W50XL85.8IN PACU FULL BODY FORC AIR

## (undated) DEVICE — SEAL

## (undated) DEVICE — SUTURE V-LOC 180 SZ 0 L9IN ABSRB GRN GS-21 L37MM 1/2 CIR VLOCL0346

## (undated) DEVICE — STERILE POLYISOPRENE POWDER-FREE SURGICAL GLOVES: Brand: PROTEXIS

## (undated) DEVICE — REDUCER CANN ENDOWRIST 12-8MM -- DA VINCI XI - SNGL USE

## (undated) DEVICE — TROCARS: Brand: KII® BALLOON BLUNT TIP SYSTEM

## (undated) DEVICE — SUTURE VCRL SZ 3-0 L27IN ABSRB VLT L26MM SH 1/2 CIR J316H

## (undated) DEVICE — STRIP,CLOSURE,WOUND,MEDI-STRIP,1/2X4: Brand: MEDLINE

## (undated) DEVICE — SEAL UNIV 5-8MM DISP BX/10 -- DA VINCI XI - SNGL USE

## (undated) DEVICE — COVER LT HNDL FLX

## (undated) DEVICE — ARM DRAPE

## (undated) DEVICE — SUTURE MCRYL SZ 4-0 L18IN ABSRB UD L19MM PS-2 3/8 CIR PRIM Y496G

## (undated) DEVICE — TROCAR: Brand: KII® OPTICAL ACCESS SYSTEM

## (undated) DEVICE — SYR 10ML LUER LOK 1/5ML GRAD --

## (undated) DEVICE — SUTURE DEV SZ 2-0 WND CLSR ABSRB GS-22 VLOC COVIDIEN VLOCM2145

## (undated) DEVICE — APPLICATOR SCRB 26ML TEAL STRL -- CHLORAPREP 26ML

## (undated) DEVICE — GLOVE SURG SZ 8 L11.77IN FNGR THK9.8MIL STRW LTX POLYMER

## (undated) DEVICE — SOLUTION IV 1000ML 0.9% SOD CHL

## (undated) DEVICE — DRAPE TOWEL: Brand: CONVERTORS

## (undated) DEVICE — NEEDLE HYPO 25GA L1.5IN BVL ORIENTED ECLIPSE

## (undated) DEVICE — GLOVE SURG BIOGEL 8.0 STRL -- SKINSENSE

## (undated) DEVICE — 3M™ STERI-DRAPE™ INSTRUMENT POUCH 1018L: Brand: STERI-DRAPE™

## (undated) DEVICE — ELECTRO LUBE IS A SINGLE PATIENT USE DEVICE THAT IS INTENDED TO BE USED ON ELECTROSURGICAL ELECTRODES TO REDUCE STICKING.: Brand: KEY SURGICAL ELECTRO LUBE

## (undated) DEVICE — BANDAGE ADH W0.75XL3IN UNIV WVN FAB NAT GEN USE STRP N ADH

## (undated) DEVICE — SUTURE VLOC 90 2/0 VL 6 GS-22 VLOCM2105

## (undated) DEVICE — SUTURE VCRL SZ 2-0 L27IN ABSRB UD L26MM SH 1/2 CIR J417H

## (undated) DEVICE — COLUMN DRAPE

## (undated) DEVICE — REM POLYHESIVE ADULT PATIENT RETURN ELECTRODE: Brand: VALLEYLAB

## (undated) DEVICE — MASTISOL ADHESIVE LIQ 2/3ML

## (undated) DEVICE — 2, DISPOSABLE SUCTION/IRRIGATOR WITH DISPOSABLE TIP: Brand: STRYKEFLOW

## (undated) DEVICE — INSTRMT SET WND CLSR SUT PASS --

## (undated) DEVICE — Device

## (undated) DEVICE — 3M™ IOBAN™ 2 ANTIMICROBIAL INCISE DRAPE 6651EZ: Brand: IOBAN™ 2

## (undated) DEVICE — SUTURE SZ 0 27IN 5/8 CIR UR-6  TAPER PT VIOLET ABSRB VICRYL J603H